# Patient Record
Sex: MALE | Race: WHITE | NOT HISPANIC OR LATINO | Employment: OTHER | URBAN - METROPOLITAN AREA
[De-identification: names, ages, dates, MRNs, and addresses within clinical notes are randomized per-mention and may not be internally consistent; named-entity substitution may affect disease eponyms.]

---

## 2020-02-19 ENCOUNTER — TELEPHONE (OUTPATIENT)
Dept: FAMILY MEDICINE | Facility: CLINIC | Age: 60
End: 2020-02-19

## 2020-02-19 ENCOUNTER — OFFICE VISIT (OUTPATIENT)
Dept: FAMILY MEDICINE | Facility: CLINIC | Age: 60
End: 2020-02-19
Payer: OTHER GOVERNMENT

## 2020-02-19 ENCOUNTER — HOSPITAL ENCOUNTER (OUTPATIENT)
Dept: RADIOLOGY | Facility: HOSPITAL | Age: 60
Discharge: HOME | End: 2020-02-19
Attending: NURSE PRACTITIONER
Payer: OTHER GOVERNMENT

## 2020-02-19 VITALS
HEIGHT: 67 IN | OXYGEN SATURATION: 96 % | BODY MASS INDEX: 15.85 KG/M2 | HEART RATE: 62 BPM | SYSTOLIC BLOOD PRESSURE: 130 MMHG | DIASTOLIC BLOOD PRESSURE: 82 MMHG | WEIGHT: 101 LBS

## 2020-02-19 DIAGNOSIS — F17.210 CIGARETTE SMOKER: ICD-10-CM

## 2020-02-19 DIAGNOSIS — F25.9 SCHIZOAFFECTIVE DISORDER, UNSPECIFIED TYPE: ICD-10-CM

## 2020-02-19 DIAGNOSIS — J20.9 ACUTE BRONCHITIS, UNSPECIFIED ORGANISM: ICD-10-CM

## 2020-02-19 DIAGNOSIS — I10 ESSENTIAL HYPERTENSION: ICD-10-CM

## 2020-02-19 DIAGNOSIS — J20.9 ACUTE BRONCHITIS, UNSPECIFIED ORGANISM: Primary | ICD-10-CM

## 2020-02-19 DIAGNOSIS — I49.3 PVC (PREMATURE VENTRICULAR CONTRACTION): ICD-10-CM

## 2020-02-19 LAB — EKG 12-LEAD: NORMAL

## 2020-02-19 PROCEDURE — 99213 PR OFFICE/OUTPT VISIT, EST, LEVL III, 20-29 MIN: ICD-10-PCS | Mod: 25,S$GLB,, | Performed by: NURSE PRACTITIONER

## 2020-02-19 PROCEDURE — 93000 ELECTROCARDIOGRAM COMPLETE: CPT | Mod: S$GLB,,, | Performed by: NURSE PRACTITIONER

## 2020-02-19 PROCEDURE — 99406 BEHAV CHNG SMOKING 3-10 MIN: CPT | Mod: S$GLB,,, | Performed by: NURSE PRACTITIONER

## 2020-02-19 PROCEDURE — 71046 X-RAY EXAM CHEST 2 VIEWS: CPT | Mod: TC,PO

## 2020-02-19 PROCEDURE — 93000 POCT EKG 12-LEAD: ICD-10-PCS | Mod: S$GLB,,, | Performed by: NURSE PRACTITIONER

## 2020-02-19 PROCEDURE — 99213 OFFICE O/P EST LOW 20 MIN: CPT | Mod: 25,S$GLB,, | Performed by: NURSE PRACTITIONER

## 2020-02-19 PROCEDURE — 99406 PR TOBACCO USE CESSATION INTERMEDIATE 3-10 MINUTES: ICD-10-PCS | Mod: S$GLB,,, | Performed by: NURSE PRACTITIONER

## 2020-02-19 RX ORDER — PREDNISONE 20 MG/1
40 TABLET ORAL DAILY
Qty: 10 TABLET | Refills: 0 | Status: SHIPPED | OUTPATIENT
Start: 2020-02-19 | End: 2020-02-24

## 2020-02-19 RX ORDER — AMLODIPINE BESYLATE 10 MG/1
10 TABLET ORAL DAILY
COMMUNITY

## 2020-02-19 RX ORDER — LISINOPRIL 40 MG/1
20 TABLET ORAL DAILY
COMMUNITY

## 2020-02-19 RX ORDER — AMOXICILLIN AND CLAVULANATE POTASSIUM 875; 125 MG/1; MG/1
1 TABLET, FILM COATED ORAL 2 TIMES DAILY
Qty: 14 TABLET | Refills: 0 | Status: SHIPPED | OUTPATIENT
Start: 2020-02-19 | End: 2021-06-14

## 2020-02-19 NOTE — PATIENT INSTRUCTIONS
How to Quit Smoking  Smoking is one of the hardest habits to break. About half of all people who have ever smoked have been able to quit. Most people who still smoke want to quit. Here are some of the best ways to stop smoking.    Keep trying  Most smokers make many attempts at quitting before they are successful. Its important not to give up.  Go cold turkey  Most former smokers quit cold turkey (all at once). Trying to cut back gradually doesn't seem to work as well, perhaps because it continues the smoking habit. Also, it is possible to inhale more while smoking fewer cigarettes. This results in the same amount of nicotine in your body.  Get support  Support programs can be a big help, especially for heavy smokers. These groups offer lectures, ways to change behavior, and peer support. Here are some ways to find a support program:  · Free national quitline: 800-QUIT-NOW (485-674-8460).  · Hospital quit-smoking programs.  · American Lung Association: (702.557.3245).  · American Cancer Society (741-073-2094).  Support at home is important too. Nonsmokers can offer praise and encouragement. If the smoker in your life finds it hard to quit, encourage them to keep trying.  Over-the-counter medicines  Nicotine replacement therapy may make quitting easier. Certain aids, such as the nicotine patch, gum, and lozenges, are available without a prescription. It is best to use these under a doctors care, though. The skin patch provides a steady supply of nicotine. Nicotine gum and lozenges give temporary bursts of low levels of nicotine. Both methods reduce the craving for cigarettes. Warning: If you have nausea, vomiting, dizziness, weakness, or a fast heartbeat, stop using these products and see your doctor.  Prescription medicines  After reviewing your smoking patterns and past attempts to quit, your doctor may offer a prescription medicine such as bupropion, varenicline, a nicotine inhaler, or nasal spray. Each has  "advantages and side effects. Your doctor can review these with you.  Health benefits of quitting  The benefits of quitting start right away and keep improving the longer you go without smoking. These benefits occur at any age.  So whether you are 17 or 70, quitting is a good decision. Some of the benefits include:  · 20 minutes: Blood pressure and pulse return to normal.  · 8 hours: Oxygen levels return to normal.  · 2 days: Ability to smell and taste begin to improve as damaged nerves regrow.  · 2 to 3 weeks: Circulation and lung function improve.  · 1 to 9 months: Coughing, congestion, and shortness of breath decrease; tiredness decreases.  · 1 year: Risk of heart attack decreases by half.  · 5 years: Risk of lung cancer decreases by half; risk of stroke becomes the same as a nonsmokers.  For more on how to quit smoking, try these online resources:   · [employer]k  · "Clearing the Air" booklet from the National Cancer [place]: [employer].gov/sites/default/files/pdf/clearing-the-air-accessible.pdf  Date Last Reviewed: 3/1/2017  © 9412-5839 [employer]. [address] [place] [address]. All rights reserved. This information is not intended as a substitute for professional medical care. Always follow your healthcare professional's instructions.        Acute Bronchitis  Your healthcare provider has told you that you have acute bronchitis. Bronchitis is infection or inflammation of the bronchial tubes (airways in the lungs). Normally, air moves easily in and out of the airways. Bronchitis narrows the airways, making it harder for air to flow in and out of the lungs. This causes symptoms such as shortness of breath, coughing up yellow or green mucus, and wheezing. Bronchitis can be acute or chronic. Acute means the condition comes on quickly and goes away in a short time, usually within 3 to 10 days. Chronic means a condition lasts a long time and often comes back.    What causes acute " bronchitis?  Acute bronchitis almost always starts as a viral respiratory infection, such as a cold or the flu. Certain factors make it more likely for a cold or flu to turn into bronchitis. These include being very young, being elderly, having a heart or lung problem, or having a weak immune system. Cigarette smoking also makes bronchitis more likely.  When bronchitis develops, the airways become swollen. The airways may also become infected with bacteria. This is known as a secondary infection.  Diagnosing acute bronchitis  Your healthcare provider will examine you and ask about your symptoms and health history. You may also have a sputum culture to test the fluid in your lungs. Chest X-rays may be done to look for infection in the lungs.  Treating acute bronchitis  Bronchitis usually clears up as the cold or flu goes away. You can help feel better faster by doing the following:  · Take medicine as directed. You may be told to take ibuprofen or other over-the-counter medicines. These help relieve inflammation in your bronchial tubes. Your healthcare provider may prescribe an inhaler to help open up the bronchial tubes. Most of the time, acute bronchitis is caused by a viral infection. Antibiotics are usually not prescribed for viral infections.  · Drink plenty of fluids, such as water, juice, or warm soup. Fluids loosen mucus so that you can cough it up. This helps you breathe more easily. Fluids also prevent dehydration.  · Make sure you get plenty of rest.  · Do not smoke. Do not allow anyone else to smoke in your home.  Recovery and follow-up  Follow up with your doctor as you are told. You will likely feel better in a week or two. But a dry cough can linger beyond that time. Let your doctor know if you still have symptoms (other than a dry cough) after 2 weeks, or if youre prone to getting bronchial infections. Take steps to protect yourself from future infections. These steps include stopping smoking and  avoiding tobacco smoke, washing your hands often, and getting a yearly flu shot.  When to call your healthcare provider  Call the healthcare provider if you have any of the following:  · Fever of 100.4°F (38.0°C) or higher, or as advised  · Symptoms that get worse, or new symptoms  · Trouble breathing  · Symptoms that dont start to improve within a week, or within 3 days of taking antibiotics   Date Last Reviewed: 12/1/2016  © 6168-4221 [employer]. [address] [place] [address], [place] [address]. All rights reserved. This information is not intended as a substitute for professional medical care. Always follow your healthcare professional's instructions.

## 2020-02-19 NOTE — TELEPHONE ENCOUNTER
----- Message from Nay Marroquin NP sent at 2/19/2020  1:36 PM CST -----  Please let patient know chest x-ray is negative for pneumonia

## 2020-02-19 NOTE — PROGRESS NOTES
SUBJECTIVE:    Patient ID: Pa Gallegos is a 59 y.o. male.    Chief Complaint: Nasal Congestion (brought bottles tb) and Cough (white mucus tb)    Pt here for sick visit- patient here with his mother today.  Patient reports started with coughing 4-5 days ago, clear mucus, occasional wheeze.  Patient denies any fever or chills, mild rhinorrhea.  Patient has been seen here in over 2 years, [facility] was last seen 8 months ago as he refused to go back to his last appointment.  Patient tells me he smokes 3 packs of cigarettes a day though mother states he likely smokes a lot last though he dumont a lot of cigarettes throughout the day.  He has never used an inhaler or nebulizer and denies diagnosis of COPD.  Patient is not aware of when his last chest x-ray was.        No visits with results within 6 Month(s) from this visit.   Latest known visit with results is:   No results found for any previous visit.       Past Medical History:   Diagnosis Date    Colonic polyp     Dyslipidemia     GERD (gastroesophageal reflux disease)     Hypertension     Hyponatremia     Schizoaffective disorder      Past Surgical History:   Procedure Laterality Date    COLONOSCOPY N/A 1/6/2016    Procedure: COLONOSCOPY;  Surgeon: Coni Navas MD;  Location: [facility];  Service: Endoscopy;  Laterality: N/A;     Family History   Problem Relation Age of Onset    Lung cancer Father        Marital Status: Single  Alcohol History:  reports that he does not drink alcohol.  Tobacco History:  reports that he has been smoking cigarettes. He has been smoking about 2.00 packs per day. He does not have any smokeless tobacco history on file.  Drug History:  has no drug history on file.    Health Maintenance Topics with due status: Not Due       Topic Last Completion Date    Colonoscopy 01/06/2016       There is no immunization history on file for this patient.    Review of patient's allergies indicates:  No Known  "Allergies    Current Outpatient Medications:     amLODIPine (NORVASC) 10 MG tablet, Take 10 mg by mouth once daily., Disp: , Rfl:     guaifenesin (MUCINEX) 600 mg 12 hr tablet, Take 1,200 mg by mouth 2 (two) times daily., Disp: , Rfl:     lisinopril (PRINIVIL,ZESTRIL) 40 MG tablet, Take 40 mg by mouth once daily., Disp: , Rfl:     loratadine (CLARITIN) 10 mg tablet, Take 10 mg by mouth once daily., Disp: , Rfl:     metoprolol succinate (TOPROL-XL) 50 MG 24 hr tablet, Take 50 mg by mouth once daily., Disp: , Rfl:     multivit-min/ferrous fumarate (MULTI VITAMIN ORAL), Take by mouth., Disp: , Rfl:     risperidone (RISPERDAL) 4 MG tablet, Take 4 mg by mouth 2 (two) times daily., Disp: , Rfl:     amoxicillin-clavulanate 875-125mg (AUGMENTIN) 875-125 mg per tablet, Take 1 tablet by mouth 2 (two) times daily., Disp: 14 tablet, Rfl: 0    cyanocobalamin (VITAMIN B-12) 1000 MCG tablet, Take 100 mcg by mouth once daily., Disp: , Rfl:     predniSONE (DELTASONE) 20 MG tablet, Take 2 tablets (40 mg total) by mouth once daily. for 5 days, Disp: 10 tablet, Rfl: 0    Review of Systems   Constitutional: Positive for appetite change ( mother reports patient has always had a very poor appetite and they struggle to get him to eat 2 meals a day). Negative for chills, fever and unexpected weight change (Patient/mother deny any significant weight loss).   HENT: Positive for rhinorrhea. Negative for congestion, ear pain, postnasal drip, sinus pain and sore throat.    Respiratory: Positive for cough and wheezing. Negative for shortness of breath.    Cardiovascular: Negative for chest pain.   Gastrointestinal: Negative for abdominal pain, nausea and vomiting.   Genitourinary: Negative for dysuria.   Skin: Negative for rash.   Neurological: Negative for dizziness and headaches.          Objective:      Vitals:    02/19/20 1048   BP: 130/82   Pulse: 62   SpO2: 96%   Weight: 45.8 kg (101 lb)   Height: 5' 7" (1.702 m)     Physical Exam "   Constitutional: He appears well-developed.   Cachectic white male   HENT:   Head: Normocephalic and atraumatic.   Right Ear: Tympanic membrane and ear canal normal.   Left Ear: Tympanic membrane and ear canal normal.   Nose: No mucosal edema or rhinorrhea. Right sinus exhibits no maxillary sinus tenderness and no frontal sinus tenderness. Left sinus exhibits no maxillary sinus tenderness and no frontal sinus tenderness.   Mouth/Throat: Mucous membranes are normal. No oropharyngeal exudate or posterior oropharyngeal erythema. No tonsillar exudate.   Eyes: Conjunctivae are normal.   Neck: Neck supple.   Cardiovascular: Normal rate. An irregular rhythm present. Exam reveals no friction rub.   No murmur heard.  Pulmonary/Chest: Effort normal. He has wheezes. He has no rales.   Significantly diminished throughout with expiratory wheeze right base posteriorly   Musculoskeletal: He exhibits no edema.   Neurological: He is alert.   Skin: No rash noted.   Psychiatric: His mood appears anxious.   Nursing note and vitals reviewed.      Assistance with smoking cessation was offered, including:  []  Medications  [x]  Counseling  [x]  Printed Information on Smoking Cessation  [x]  Referral to a Smoking Cessation Program    Patient was counseled regarding smoking for 3-10 minutes.    Assessment:       1. Acute bronchitis, unspecified organism    2. PVC (premature ventricular contraction)    3. Essential hypertension    4. Schizoaffective disorder, unspecified type    5. Cigarette smoker           Plan:       Acute bronchitis, unspecified organism  -     predniSONE (DELTASONE) 20 MG tablet; Take 2 tablets (40 mg total) by mouth once daily. for 5 days  Dispense: 10 tablet; Refill: 0  -     amoxicillin-clavulanate 875-125mg (AUGMENTIN) 875-125 mg per tablet; Take 1 tablet by mouth 2 (two) times daily.  Dispense: 14 tablet; Refill: 0  -     X-Ray Chest PA And Lateral; Future; Expected date: 02/19/2020  -patient/mother advised add  "steroids and antibiotics for acute bronchitis though given tobacco use and exam suspect COPD.  When I try to talk to patient about other medications he repeatedly tells me "I am only here for my bronchitis" and does not want to consider inhaler use.  He did finally agree to at least a x-ray as it is unclear when the last time he had one    PVC (premature ventricular contraction)  -     POCT EKG 12-LEAD (NOT FOR [facility] USE)  -frequent PVCs on EKG, patient asymptomatic, continue metoprolol    Essential hypertension  -BP well controlled.  Stressed importance with patient of regular lab work and encouraged [facility] by [facility]    Schizoaffective disorder, unspecified type  -mother reports stable, managed with risperidone only    Cigarette smoker  -smoking cessation counseling provided though patient is not interested and trying to quit.  Also discussed with him lung cancer screening CT scan which I recommend which he refuses, is agreeable for chest x-ray      Follow up in about 3 months (around 5/19/2020).        2/19/2020 Nay Marroquin NP    " Yes

## 2020-05-12 ENCOUNTER — TELEPHONE (OUTPATIENT)
Dept: FAMILY MEDICINE | Facility: CLINIC | Age: 60
End: 2020-05-12

## 2021-06-14 ENCOUNTER — HOSPITAL ENCOUNTER (INPATIENT)
Facility: HOSPITAL | Age: 61
LOS: 2 days | Discharge: HOME | DRG: 641 | End: 2021-06-16
Attending: EMERGENCY MEDICINE | Admitting: INTERNAL MEDICINE
Payer: OTHER GOVERNMENT

## 2021-06-14 DIAGNOSIS — R56.9 SEIZURE: ICD-10-CM

## 2021-06-14 DIAGNOSIS — E87.1 HYPONATREMIA: Primary | ICD-10-CM

## 2021-06-14 DIAGNOSIS — R41.82 ALTERED MENTAL STATUS: ICD-10-CM

## 2021-06-14 DIAGNOSIS — R09.02 HYPOXIA: ICD-10-CM

## 2021-06-14 LAB
ALBUMIN SERPL BCP-MCNC: 4.1 G/DL (ref 3.5–5.2)
ALP SERPL-CCNC: 83 U/L (ref 55–135)
ALT SERPL W/O P-5'-P-CCNC: 21 U/L (ref 10–44)
AMPHET+METHAMPHET UR QL: NEGATIVE
ANION GAP SERPL CALC-SCNC: 11 MMOL/L (ref 8–16)
ANION GAP SERPL CALC-SCNC: 15 MMOL/L (ref 8–16)
AST SERPL-CCNC: 41 U/L (ref 10–40)
BARBITURATES UR QL SCN>200 NG/ML: NEGATIVE
BASOPHILS # BLD AUTO: 0.01 K/UL (ref 0–0.2)
BASOPHILS NFR BLD: 0.1 % (ref 0–1.9)
BENZODIAZ UR QL SCN>200 NG/ML: NEGATIVE
BILIRUB SERPL-MCNC: 1.9 MG/DL (ref 0.1–1)
BILIRUB UR QL STRIP: NEGATIVE
BUN SERPL-MCNC: 13 MG/DL (ref 8–23)
BUN SERPL-MCNC: 13 MG/DL (ref 8–23)
BZE UR QL SCN: NEGATIVE
CALCIUM SERPL-MCNC: 8.5 MG/DL (ref 8.7–10.5)
CALCIUM SERPL-MCNC: 8.6 MG/DL (ref 8.7–10.5)
CANNABINOIDS UR QL SCN: NEGATIVE
CHLORIDE SERPL-SCNC: 77 MMOL/L (ref 95–110)
CHLORIDE SERPL-SCNC: 84 MMOL/L (ref 95–110)
CLARITY UR: CLEAR
CO2 SERPL-SCNC: 24 MMOL/L (ref 23–29)
CO2 SERPL-SCNC: 25 MMOL/L (ref 23–29)
COLOR UR: YELLOW
CREAT SERPL-MCNC: 0.6 MG/DL (ref 0.5–1.4)
CREAT SERPL-MCNC: 0.9 MG/DL (ref 0.5–1.4)
CREAT UR-MCNC: 31 MG/DL (ref 23–375)
DIFFERENTIAL METHOD: ABNORMAL
EOSINOPHIL # BLD AUTO: 0 K/UL (ref 0–0.5)
EOSINOPHIL NFR BLD: 0 % (ref 0–8)
ERYTHROCYTE [DISTWIDTH] IN BLOOD BY AUTOMATED COUNT: 12.1 % (ref 11.5–14.5)
EST. GFR  (AFRICAN AMERICAN): >60 ML/MIN/1.73 M^2
EST. GFR  (AFRICAN AMERICAN): >60 ML/MIN/1.73 M^2
EST. GFR  (NON AFRICAN AMERICAN): >60 ML/MIN/1.73 M^2
EST. GFR  (NON AFRICAN AMERICAN): >60 ML/MIN/1.73 M^2
ETHANOL SERPL-MCNC: <5 MG/DL
GLUCOSE SERPL-MCNC: 106 MG/DL (ref 70–110)
GLUCOSE SERPL-MCNC: 147 MG/DL (ref 70–110)
GLUCOSE UR QL STRIP: NEGATIVE
HCT VFR BLD AUTO: 36.1 % (ref 40–54)
HGB BLD-MCNC: 12.9 G/DL (ref 14–18)
HGB UR QL STRIP: NEGATIVE
IMM GRANULOCYTES # BLD AUTO: 0.1 K/UL (ref 0–0.04)
IMM GRANULOCYTES NFR BLD AUTO: 0.7 % (ref 0–0.5)
KETONES UR QL STRIP: ABNORMAL
LEUKOCYTE ESTERASE UR QL STRIP: NEGATIVE
LYMPHOCYTES # BLD AUTO: 1 K/UL (ref 1–4.8)
LYMPHOCYTES NFR BLD: 6.8 % (ref 18–48)
MAGNESIUM SERPL-MCNC: 1.7 MG/DL (ref 1.6–2.6)
MCH RBC QN AUTO: 30.4 PG (ref 27–31)
MCHC RBC AUTO-ENTMCNC: 35.7 G/DL (ref 32–36)
MCV RBC AUTO: 85 FL (ref 82–98)
MONOCYTES # BLD AUTO: 1.1 K/UL (ref 0.3–1)
MONOCYTES NFR BLD: 7.6 % (ref 4–15)
NEUTROPHILS # BLD AUTO: 12.4 K/UL (ref 1.8–7.7)
NEUTROPHILS NFR BLD: 84.8 % (ref 38–73)
NITRITE UR QL STRIP: NEGATIVE
NRBC BLD-RTO: 0 /100 WBC
OPIATES UR QL SCN: NEGATIVE
OSMOLALITY UR: 267 MOSM/KG (ref 50–1200)
PCP UR QL SCN>25 NG/ML: NEGATIVE
PH UR STRIP: 7 [PH] (ref 5–8)
PLATELET # BLD AUTO: 298 K/UL (ref 150–450)
PMV BLD AUTO: 9.4 FL (ref 9.2–12.9)
POTASSIUM SERPL-SCNC: 3.2 MMOL/L (ref 3.5–5.1)
POTASSIUM SERPL-SCNC: 3.4 MMOL/L (ref 3.5–5.1)
PROT SERPL-MCNC: 7.2 G/DL (ref 6–8.4)
PROT UR QL STRIP: ABNORMAL
RBC # BLD AUTO: 4.24 M/UL (ref 4.6–6.2)
SARS-COV-2 RDRP RESP QL NAA+PROBE: NEGATIVE
SODIUM SERPL-SCNC: 116 MMOL/L (ref 136–145)
SODIUM SERPL-SCNC: 120 MMOL/L (ref 136–145)
SODIUM UR-SCNC: 38 MMOL/L (ref 20–250)
SP GR UR STRIP: 1.01 (ref 1–1.03)
TOXICOLOGY INFORMATION: NORMAL
TROPONIN I SERPL DL<=0.01 NG/ML-MCNC: <0.03 NG/ML
URN SPEC COLLECT METH UR: ABNORMAL
UROBILINOGEN UR STRIP-ACNC: NEGATIVE EU/DL
WBC # BLD AUTO: 14.64 K/UL (ref 3.9–12.7)

## 2021-06-14 PROCEDURE — 25000003 PHARM REV CODE 250: Performed by: NURSE PRACTITIONER

## 2021-06-14 PROCEDURE — 81003 URINALYSIS AUTO W/O SCOPE: CPT | Mod: 59 | Performed by: EMERGENCY MEDICINE

## 2021-06-14 PROCEDURE — 84300 ASSAY OF URINE SODIUM: CPT | Performed by: NURSE PRACTITIONER

## 2021-06-14 PROCEDURE — 63600175 PHARM REV CODE 636 W HCPCS: Performed by: NURSE PRACTITIONER

## 2021-06-14 PROCEDURE — 83735 ASSAY OF MAGNESIUM: CPT | Performed by: EMERGENCY MEDICINE

## 2021-06-14 PROCEDURE — 84484 ASSAY OF TROPONIN QUANT: CPT | Performed by: NURSE PRACTITIONER

## 2021-06-14 PROCEDURE — 63600175 PHARM REV CODE 636 W HCPCS: Performed by: EMERGENCY MEDICINE

## 2021-06-14 PROCEDURE — 20000000 HC ICU ROOM

## 2021-06-14 PROCEDURE — 36415 COLL VENOUS BLD VENIPUNCTURE: CPT | Performed by: EMERGENCY MEDICINE

## 2021-06-14 PROCEDURE — 80048 BASIC METABOLIC PNL TOTAL CA: CPT | Performed by: NURSE PRACTITIONER

## 2021-06-14 PROCEDURE — 93010 ELECTROCARDIOGRAM REPORT: CPT | Mod: ,,, | Performed by: SPECIALIST

## 2021-06-14 PROCEDURE — 82077 ASSAY SPEC XCP UR&BREATH IA: CPT | Performed by: EMERGENCY MEDICINE

## 2021-06-14 PROCEDURE — 99285 EMERGENCY DEPT VISIT HI MDM: CPT | Mod: 25

## 2021-06-14 PROCEDURE — 83935 ASSAY OF URINE OSMOLALITY: CPT | Mod: 91 | Performed by: PSYCHIATRY & NEUROLOGY

## 2021-06-14 PROCEDURE — 80307 DRUG TEST PRSMV CHEM ANLYZR: CPT | Performed by: EMERGENCY MEDICINE

## 2021-06-14 PROCEDURE — 25000003 PHARM REV CODE 250: Performed by: EMERGENCY MEDICINE

## 2021-06-14 PROCEDURE — 93005 ELECTROCARDIOGRAM TRACING: CPT | Performed by: SPECIALIST

## 2021-06-14 PROCEDURE — 96365 THER/PROPH/DIAG IV INF INIT: CPT

## 2021-06-14 PROCEDURE — 85025 COMPLETE CBC W/AUTO DIFF WBC: CPT | Performed by: EMERGENCY MEDICINE

## 2021-06-14 PROCEDURE — U0002 COVID-19 LAB TEST NON-CDC: HCPCS | Performed by: EMERGENCY MEDICINE

## 2021-06-14 PROCEDURE — 84484 ASSAY OF TROPONIN QUANT: CPT | Mod: 91 | Performed by: EMERGENCY MEDICINE

## 2021-06-14 PROCEDURE — 93010 EKG 12-LEAD: ICD-10-PCS | Mod: ,,, | Performed by: SPECIALIST

## 2021-06-14 PROCEDURE — 80053 COMPREHEN METABOLIC PANEL: CPT | Performed by: EMERGENCY MEDICINE

## 2021-06-14 PROCEDURE — 63600175 PHARM REV CODE 636 W HCPCS: Performed by: INTERNAL MEDICINE

## 2021-06-14 PROCEDURE — 83935 ASSAY OF URINE OSMOLALITY: CPT | Performed by: NURSE PRACTITIONER

## 2021-06-14 RX ORDER — LANOLIN ALCOHOL/MO/W.PET/CERES
800 CREAM (GRAM) TOPICAL
Status: DISCONTINUED | OUTPATIENT
Start: 2021-06-14 | End: 2021-06-16 | Stop reason: HOSPADM

## 2021-06-14 RX ORDER — POTASSIUM CHLORIDE 7.45 MG/ML
20 INJECTION INTRAVENOUS
Status: DISCONTINUED | OUTPATIENT
Start: 2021-06-14 | End: 2021-06-16 | Stop reason: HOSPADM

## 2021-06-14 RX ORDER — POTASSIUM CHLORIDE 20 MEQ/1
20 TABLET, EXTENDED RELEASE ORAL
Status: DISCONTINUED | OUTPATIENT
Start: 2021-06-14 | End: 2021-06-16 | Stop reason: HOSPADM

## 2021-06-14 RX ORDER — MAGNESIUM SULFATE HEPTAHYDRATE 40 MG/ML
2 INJECTION, SOLUTION INTRAVENOUS
Status: DISCONTINUED | OUTPATIENT
Start: 2021-06-14 | End: 2021-06-16 | Stop reason: HOSPADM

## 2021-06-14 RX ORDER — SODIUM CHLORIDE 0.9 % (FLUSH) 0.9 %
10 SYRINGE (ML) INJECTION
Status: DISCONTINUED | OUTPATIENT
Start: 2021-06-14 | End: 2021-06-16 | Stop reason: HOSPADM

## 2021-06-14 RX ORDER — SODIUM CHLORIDE 9 MG/ML
INJECTION, SOLUTION INTRAVENOUS CONTINUOUS
Status: DISCONTINUED | OUTPATIENT
Start: 2021-06-14 | End: 2021-06-16 | Stop reason: HOSPADM

## 2021-06-14 RX ORDER — IPRATROPIUM BROMIDE AND ALBUTEROL SULFATE 2.5; .5 MG/3ML; MG/3ML
3 SOLUTION RESPIRATORY (INHALATION) EVERY 8 HOURS
Status: DISCONTINUED | OUTPATIENT
Start: 2021-06-15 | End: 2021-06-15

## 2021-06-14 RX ORDER — MAGNESIUM SULFATE 1 G/100ML
1 INJECTION INTRAVENOUS
Status: DISCONTINUED | OUTPATIENT
Start: 2021-06-14 | End: 2021-06-16 | Stop reason: HOSPADM

## 2021-06-14 RX ORDER — 3% SODIUM CHLORIDE 3 G/100ML
100 INJECTION, SOLUTION INTRAVENOUS CONTINUOUS
Status: DISCONTINUED | OUTPATIENT
Start: 2021-06-14 | End: 2021-06-14

## 2021-06-14 RX ORDER — ACETAMINOPHEN 325 MG/1
650 TABLET ORAL EVERY 4 HOURS PRN
Status: DISCONTINUED | OUTPATIENT
Start: 2021-06-14 | End: 2021-06-16 | Stop reason: HOSPADM

## 2021-06-14 RX ORDER — RISPERIDONE 1 MG/1
4 TABLET ORAL 2 TIMES DAILY
Status: DISCONTINUED | OUTPATIENT
Start: 2021-06-14 | End: 2021-06-16 | Stop reason: HOSPADM

## 2021-06-14 RX ORDER — CETIRIZINE HYDROCHLORIDE 10 MG/1
10 TABLET ORAL NIGHTLY
Status: DISCONTINUED | OUTPATIENT
Start: 2021-06-14 | End: 2021-06-16 | Stop reason: HOSPADM

## 2021-06-14 RX ORDER — POTASSIUM CHLORIDE 7.45 MG/ML
40 INJECTION INTRAVENOUS
Status: DISCONTINUED | OUTPATIENT
Start: 2021-06-14 | End: 2021-06-16 | Stop reason: HOSPADM

## 2021-06-14 RX ORDER — ONDANSETRON 2 MG/ML
4 INJECTION INTRAMUSCULAR; INTRAVENOUS EVERY 6 HOURS PRN
Status: DISCONTINUED | OUTPATIENT
Start: 2021-06-14 | End: 2021-06-16 | Stop reason: HOSPADM

## 2021-06-14 RX ORDER — HYDRALAZINE HYDROCHLORIDE 20 MG/ML
10 INJECTION INTRAMUSCULAR; INTRAVENOUS EVERY 6 HOURS PRN
Status: DISCONTINUED | OUTPATIENT
Start: 2021-06-14 | End: 2021-06-16 | Stop reason: HOSPADM

## 2021-06-14 RX ORDER — LEVETIRACETAM 5 MG/ML
500 INJECTION INTRAVASCULAR ONCE
Status: COMPLETED | OUTPATIENT
Start: 2021-06-14 | End: 2021-06-14

## 2021-06-14 RX ORDER — POTASSIUM CHLORIDE 20 MEQ/1
40 TABLET, EXTENDED RELEASE ORAL
Status: DISCONTINUED | OUTPATIENT
Start: 2021-06-14 | End: 2021-06-16 | Stop reason: HOSPADM

## 2021-06-14 RX ORDER — GUAIFENESIN 600 MG/1
1200 TABLET, EXTENDED RELEASE ORAL 2 TIMES DAILY
Status: DISCONTINUED | OUTPATIENT
Start: 2021-06-14 | End: 2021-06-16 | Stop reason: HOSPADM

## 2021-06-14 RX ORDER — LISINOPRIL 20 MG/1
20 TABLET ORAL NIGHTLY
Status: DISCONTINUED | OUTPATIENT
Start: 2021-06-14 | End: 2021-06-16 | Stop reason: HOSPADM

## 2021-06-14 RX ORDER — MAGNESIUM SULFATE HEPTAHYDRATE 40 MG/ML
4 INJECTION, SOLUTION INTRAVENOUS
Status: DISCONTINUED | OUTPATIENT
Start: 2021-06-14 | End: 2021-06-16 | Stop reason: HOSPADM

## 2021-06-14 RX ORDER — AMLODIPINE BESYLATE 5 MG/1
10 TABLET ORAL NIGHTLY
Status: DISCONTINUED | OUTPATIENT
Start: 2021-06-14 | End: 2021-06-16 | Stop reason: HOSPADM

## 2021-06-14 RX ORDER — METOPROLOL SUCCINATE 50 MG/1
50 TABLET, EXTENDED RELEASE ORAL NIGHTLY
Status: DISCONTINUED | OUTPATIENT
Start: 2021-06-14 | End: 2021-06-16 | Stop reason: HOSPADM

## 2021-06-14 RX ADMIN — HYDRALAZINE HYDROCHLORIDE 10 MG: 20 INJECTION, SOLUTION INTRAMUSCULAR; INTRAVENOUS at 07:06

## 2021-06-14 RX ADMIN — CETIRIZINE HYDROCHLORIDE 10 MG: 10 TABLET, FILM COATED ORAL at 10:06

## 2021-06-14 RX ADMIN — POTASSIUM CHLORIDE 40 MEQ: 20 TABLET, EXTENDED RELEASE ORAL at 10:06

## 2021-06-14 RX ADMIN — SODIUM CHLORIDE 50 ML/HR: 0.9 INJECTION, SOLUTION INTRAVENOUS at 09:06

## 2021-06-14 RX ADMIN — GUAIFENESIN 1200 MG: 600 TABLET, EXTENDED RELEASE ORAL at 10:06

## 2021-06-14 RX ADMIN — SODIUM CHLORIDE: 234 INJECTION, SOLUTION, CONCENTRATE INTRAVENOUS at 05:06

## 2021-06-14 RX ADMIN — POTASSIUM CHLORIDE 40 MEQ: 1500 TABLET, EXTENDED RELEASE ORAL at 10:06

## 2021-06-14 RX ADMIN — RISPERIDONE 4 MG: 1 TABLET ORAL at 10:06

## 2021-06-14 RX ADMIN — MAGNESIUM OXIDE 800 MG: 400 TABLET ORAL at 10:06

## 2021-06-14 RX ADMIN — LISINOPRIL 20 MG: 20 TABLET ORAL at 10:06

## 2021-06-14 RX ADMIN — LEVETIRACETAM 500 MG: 5 INJECTION, SOLUTION INTRAVENOUS at 09:06

## 2021-06-14 RX ADMIN — AMLODIPINE BESYLATE 10 MG: 5 TABLET ORAL at 10:06

## 2021-06-14 RX ADMIN — METOPROLOL SUCCINATE 50 MG: 50 TABLET, FILM COATED, EXTENDED RELEASE ORAL at 10:06

## 2021-06-15 LAB
25(OH)D3+25(OH)D2 SERPL-MCNC: 15 NG/ML (ref 30–96)
ALBUMIN SERPL BCP-MCNC: 3.5 G/DL (ref 3.5–5.2)
ALP SERPL-CCNC: 75 U/L (ref 55–135)
ALT SERPL W/O P-5'-P-CCNC: 17 U/L (ref 10–44)
AMMONIA PLAS-SCNC: 20 UMOL/L (ref 10–50)
ANION GAP SERPL CALC-SCNC: 10 MMOL/L (ref 8–16)
ANION GAP SERPL CALC-SCNC: 11 MMOL/L (ref 8–16)
ANION GAP SERPL CALC-SCNC: 12 MMOL/L (ref 8–16)
ANION GAP SERPL CALC-SCNC: 9 MMOL/L (ref 8–16)
AST SERPL-CCNC: 29 U/L (ref 10–40)
BASOPHILS # BLD AUTO: 0.01 K/UL (ref 0–0.2)
BASOPHILS NFR BLD: 0.1 % (ref 0–1.9)
BILIRUB SERPL-MCNC: 1.2 MG/DL (ref 0.1–1)
BUN SERPL-MCNC: 10 MG/DL (ref 8–23)
BUN SERPL-MCNC: 13 MG/DL (ref 8–23)
CALCIUM SERPL-MCNC: 7.9 MG/DL (ref 8.7–10.5)
CALCIUM SERPL-MCNC: 7.9 MG/DL (ref 8.7–10.5)
CALCIUM SERPL-MCNC: 8.3 MG/DL (ref 8.7–10.5)
CALCIUM SERPL-MCNC: 8.5 MG/DL (ref 8.7–10.5)
CALCIUM SERPL-MCNC: 8.7 MG/DL (ref 8.7–10.5)
CALCIUM SERPL-MCNC: 8.8 MG/DL (ref 8.7–10.5)
CHLORIDE SERPL-SCNC: 86 MMOL/L (ref 95–110)
CHLORIDE SERPL-SCNC: 90 MMOL/L (ref 95–110)
CHLORIDE SERPL-SCNC: 91 MMOL/L (ref 95–110)
CHLORIDE SERPL-SCNC: 93 MMOL/L (ref 95–110)
CHLORIDE SERPL-SCNC: 95 MMOL/L (ref 95–110)
CHLORIDE SERPL-SCNC: 95 MMOL/L (ref 95–110)
CO2 SERPL-SCNC: 23 MMOL/L (ref 23–29)
CO2 SERPL-SCNC: 23 MMOL/L (ref 23–29)
CO2 SERPL-SCNC: 25 MMOL/L (ref 23–29)
CO2 SERPL-SCNC: 26 MMOL/L (ref 23–29)
CO2 SERPL-SCNC: 26 MMOL/L (ref 23–29)
CO2 SERPL-SCNC: 28 MMOL/L (ref 23–29)
CREAT SERPL-MCNC: 0.5 MG/DL (ref 0.5–1.4)
CREAT SERPL-MCNC: 0.5 MG/DL (ref 0.5–1.4)
CREAT SERPL-MCNC: 0.6 MG/DL (ref 0.5–1.4)
CREAT SERPL-MCNC: 0.6 MG/DL (ref 0.5–1.4)
CREAT SERPL-MCNC: 0.7 MG/DL (ref 0.5–1.4)
CREAT SERPL-MCNC: 0.7 MG/DL (ref 0.5–1.4)
DIFFERENTIAL METHOD: ABNORMAL
EOSINOPHIL # BLD AUTO: 0 K/UL (ref 0–0.5)
EOSINOPHIL NFR BLD: 0.1 % (ref 0–8)
ERYTHROCYTE [DISTWIDTH] IN BLOOD BY AUTOMATED COUNT: 12.7 % (ref 11.5–14.5)
EST. GFR  (AFRICAN AMERICAN): >60 ML/MIN/1.73 M^2
EST. GFR  (NON AFRICAN AMERICAN): >60 ML/MIN/1.73 M^2
GLUCOSE SERPL-MCNC: 107 MG/DL (ref 70–110)
GLUCOSE SERPL-MCNC: 115 MG/DL (ref 70–110)
GLUCOSE SERPL-MCNC: 122 MG/DL (ref 70–110)
GLUCOSE SERPL-MCNC: 83 MG/DL (ref 70–110)
GLUCOSE SERPL-MCNC: 83 MG/DL (ref 70–110)
GLUCOSE SERPL-MCNC: 97 MG/DL (ref 70–110)
HCT VFR BLD AUTO: 36.1 % (ref 40–54)
HGB BLD-MCNC: 12.7 G/DL (ref 14–18)
IMM GRANULOCYTES # BLD AUTO: 0.02 K/UL (ref 0–0.04)
IMM GRANULOCYTES NFR BLD AUTO: 0.2 % (ref 0–0.5)
LYMPHOCYTES # BLD AUTO: 1.5 K/UL (ref 1–4.8)
LYMPHOCYTES NFR BLD: 17.5 % (ref 18–48)
MAGNESIUM SERPL-MCNC: 1.9 MG/DL (ref 1.6–2.6)
MCH RBC QN AUTO: 30.1 PG (ref 27–31)
MCHC RBC AUTO-ENTMCNC: 35.2 G/DL (ref 32–36)
MCV RBC AUTO: 86 FL (ref 82–98)
MONOCYTES # BLD AUTO: 1.2 K/UL (ref 0.3–1)
MONOCYTES NFR BLD: 13.2 % (ref 4–15)
NEUTROPHILS # BLD AUTO: 6.1 K/UL (ref 1.8–7.7)
NEUTROPHILS NFR BLD: 68.9 % (ref 38–73)
NRBC BLD-RTO: 0 /100 WBC
OSMOLALITY UR: 133 MOSM/KG (ref 50–1200)
PLATELET # BLD AUTO: 246 K/UL (ref 150–450)
PMV BLD AUTO: 10.2 FL (ref 9.2–12.9)
POTASSIUM SERPL-SCNC: 3.3 MMOL/L (ref 3.5–5.1)
POTASSIUM SERPL-SCNC: 3.5 MMOL/L (ref 3.5–5.1)
POTASSIUM SERPL-SCNC: 3.8 MMOL/L (ref 3.5–5.1)
POTASSIUM SERPL-SCNC: 3.8 MMOL/L (ref 3.5–5.1)
POTASSIUM SERPL-SCNC: 3.9 MMOL/L (ref 3.5–5.1)
POTASSIUM SERPL-SCNC: 4.1 MMOL/L (ref 3.5–5.1)
PROT SERPL-MCNC: 6 G/DL (ref 6–8.4)
RBC # BLD AUTO: 4.22 M/UL (ref 4.6–6.2)
SODIUM SERPL-SCNC: 123 MMOL/L (ref 136–145)
SODIUM SERPL-SCNC: 126 MMOL/L (ref 136–145)
SODIUM SERPL-SCNC: 128 MMOL/L (ref 136–145)
SODIUM SERPL-SCNC: 130 MMOL/L (ref 136–145)
TROPONIN I SERPL DL<=0.01 NG/ML-MCNC: <0.03 NG/ML
TROPONIN I SERPL DL<=0.01 NG/ML-MCNC: <0.03 NG/ML
VIT B12 SERPL-MCNC: 400 PG/ML (ref 210–950)
WBC # BLD AUTO: 8.81 K/UL (ref 3.9–12.7)

## 2021-06-15 PROCEDURE — 99900035 HC TECH TIME PER 15 MIN (STAT)

## 2021-06-15 PROCEDURE — 80053 COMPREHEN METABOLIC PANEL: CPT | Performed by: NURSE PRACTITIONER

## 2021-06-15 PROCEDURE — 25000242 PHARM REV CODE 250 ALT 637 W/ HCPCS: Performed by: INTERNAL MEDICINE

## 2021-06-15 PROCEDURE — 95819 EEG AWAKE AND ASLEEP: CPT

## 2021-06-15 PROCEDURE — 20000000 HC ICU ROOM

## 2021-06-15 PROCEDURE — 94761 N-INVAS EAR/PLS OXIMETRY MLT: CPT

## 2021-06-15 PROCEDURE — 80048 BASIC METABOLIC PNL TOTAL CA: CPT | Performed by: NURSE PRACTITIONER

## 2021-06-15 PROCEDURE — 84425 ASSAY OF VITAMIN B-1: CPT | Performed by: STUDENT IN AN ORGANIZED HEALTH CARE EDUCATION/TRAINING PROGRAM

## 2021-06-15 PROCEDURE — 25000003 PHARM REV CODE 250: Performed by: NURSE PRACTITIONER

## 2021-06-15 PROCEDURE — 84484 ASSAY OF TROPONIN QUANT: CPT | Performed by: NURSE PRACTITIONER

## 2021-06-15 PROCEDURE — 82607 VITAMIN B-12: CPT | Performed by: STUDENT IN AN ORGANIZED HEALTH CARE EDUCATION/TRAINING PROGRAM

## 2021-06-15 PROCEDURE — 36415 COLL VENOUS BLD VENIPUNCTURE: CPT | Performed by: NURSE PRACTITIONER

## 2021-06-15 PROCEDURE — 85025 COMPLETE CBC W/AUTO DIFF WBC: CPT | Performed by: NURSE PRACTITIONER

## 2021-06-15 PROCEDURE — 83735 ASSAY OF MAGNESIUM: CPT | Performed by: NURSE PRACTITIONER

## 2021-06-15 PROCEDURE — 80048 BASIC METABOLIC PNL TOTAL CA: CPT | Mod: 91 | Performed by: INTERNAL MEDICINE

## 2021-06-15 PROCEDURE — 25000003 PHARM REV CODE 250: Performed by: INTERNAL MEDICINE

## 2021-06-15 PROCEDURE — 94640 AIRWAY INHALATION TREATMENT: CPT

## 2021-06-15 PROCEDURE — 84207 ASSAY OF VITAMIN B-6: CPT | Performed by: STUDENT IN AN ORGANIZED HEALTH CARE EDUCATION/TRAINING PROGRAM

## 2021-06-15 PROCEDURE — 27000221 HC OXYGEN, UP TO 24 HOURS

## 2021-06-15 PROCEDURE — 25000003 PHARM REV CODE 250

## 2021-06-15 PROCEDURE — 99900031 HC PATIENT EDUCATION (STAT)

## 2021-06-15 PROCEDURE — 82306 VITAMIN D 25 HYDROXY: CPT | Performed by: STUDENT IN AN ORGANIZED HEALTH CARE EDUCATION/TRAINING PROGRAM

## 2021-06-15 PROCEDURE — 25000003 PHARM REV CODE 250: Performed by: STUDENT IN AN ORGANIZED HEALTH CARE EDUCATION/TRAINING PROGRAM

## 2021-06-15 PROCEDURE — 82140 ASSAY OF AMMONIA: CPT | Performed by: STUDENT IN AN ORGANIZED HEALTH CARE EDUCATION/TRAINING PROGRAM

## 2021-06-15 RX ORDER — CHLORHEXIDINE GLUCONATE ORAL RINSE 1.2 MG/ML
15 SOLUTION DENTAL 2 TIMES DAILY
Status: DISCONTINUED | OUTPATIENT
Start: 2021-06-15 | End: 2021-06-16 | Stop reason: HOSPADM

## 2021-06-15 RX ORDER — IPRATROPIUM BROMIDE AND ALBUTEROL SULFATE 2.5; .5 MG/3ML; MG/3ML
SOLUTION RESPIRATORY (INHALATION)
Status: DISPENSED
Start: 2021-06-15 | End: 2021-06-15

## 2021-06-15 RX ORDER — MUPIROCIN 20 MG/G
OINTMENT TOPICAL 2 TIMES DAILY
Status: DISCONTINUED | OUTPATIENT
Start: 2021-06-15 | End: 2021-06-16 | Stop reason: HOSPADM

## 2021-06-15 RX ORDER — IPRATROPIUM BROMIDE AND ALBUTEROL SULFATE 2.5; .5 MG/3ML; MG/3ML
3 SOLUTION RESPIRATORY (INHALATION) 3 TIMES DAILY
Status: DISCONTINUED | OUTPATIENT
Start: 2021-06-15 | End: 2021-06-15

## 2021-06-15 RX ADMIN — AMLODIPINE BESYLATE 10 MG: 5 TABLET ORAL at 08:06

## 2021-06-15 RX ADMIN — DEXTROSE 500 ML: 5 SOLUTION INTRAVENOUS at 11:06

## 2021-06-15 RX ADMIN — POTASSIUM CHLORIDE 40 MEQ: 20 TABLET, EXTENDED RELEASE ORAL at 02:06

## 2021-06-15 RX ADMIN — LISINOPRIL 20 MG: 20 TABLET ORAL at 08:06

## 2021-06-15 RX ADMIN — CHLORHEXIDINE GLUCONATE 15 ML: 1.2 RINSE ORAL at 08:06

## 2021-06-15 RX ADMIN — GUAIFENESIN 1200 MG: 600 TABLET, EXTENDED RELEASE ORAL at 08:06

## 2021-06-15 RX ADMIN — MUPIROCIN: 20 OINTMENT TOPICAL at 08:06

## 2021-06-15 RX ADMIN — IPRATROPIUM BROMIDE AND ALBUTEROL SULFATE 3 ML: .5; 3 SOLUTION RESPIRATORY (INHALATION) at 07:06

## 2021-06-15 RX ADMIN — RISPERIDONE 4 MG: 1 TABLET ORAL at 08:06

## 2021-06-15 RX ADMIN — METOPROLOL SUCCINATE 50 MG: 50 TABLET, FILM COATED, EXTENDED RELEASE ORAL at 08:06

## 2021-06-15 RX ADMIN — CETIRIZINE HYDROCHLORIDE 10 MG: 10 TABLET, FILM COATED ORAL at 08:06

## 2021-06-15 RX ADMIN — DEXTROSE 500 ML: 5 SOLUTION INTRAVENOUS at 06:06

## 2021-06-16 VITALS
BODY MASS INDEX: 14.61 KG/M2 | WEIGHT: 93.06 LBS | HEIGHT: 67 IN | DIASTOLIC BLOOD PRESSURE: 68 MMHG | HEART RATE: 74 BPM | SYSTOLIC BLOOD PRESSURE: 123 MMHG | RESPIRATION RATE: 19 BRPM | OXYGEN SATURATION: 85 % | TEMPERATURE: 98 F

## 2021-06-16 LAB
ALBUMIN SERPL BCP-MCNC: 3.6 G/DL (ref 3.5–5.2)
ALP SERPL-CCNC: 78 U/L (ref 55–135)
ALT SERPL W/O P-5'-P-CCNC: 17 U/L (ref 10–44)
ANION GAP SERPL CALC-SCNC: 6 MMOL/L (ref 8–16)
ANION GAP SERPL CALC-SCNC: 7 MMOL/L (ref 8–16)
AST SERPL-CCNC: 28 U/L (ref 10–40)
BILIRUB SERPL-MCNC: 0.7 MG/DL (ref 0.1–1)
BUN SERPL-MCNC: 10 MG/DL (ref 8–23)
BUN SERPL-MCNC: 12 MG/DL (ref 8–23)
CALCIUM SERPL-MCNC: 8.5 MG/DL (ref 8.7–10.5)
CALCIUM SERPL-MCNC: 8.6 MG/DL (ref 8.7–10.5)
CHLORIDE SERPL-SCNC: 96 MMOL/L (ref 95–110)
CHLORIDE SERPL-SCNC: 97 MMOL/L (ref 95–110)
CO2 SERPL-SCNC: 28 MMOL/L (ref 23–29)
CO2 SERPL-SCNC: 28 MMOL/L (ref 23–29)
CREAT SERPL-MCNC: 0.6 MG/DL (ref 0.5–1.4)
CREAT SERPL-MCNC: 0.7 MG/DL (ref 0.5–1.4)
EST. GFR  (AFRICAN AMERICAN): >60 ML/MIN/1.73 M^2
EST. GFR  (AFRICAN AMERICAN): >60 ML/MIN/1.73 M^2
EST. GFR  (NON AFRICAN AMERICAN): >60 ML/MIN/1.73 M^2
EST. GFR  (NON AFRICAN AMERICAN): >60 ML/MIN/1.73 M^2
FERRITIN SERPL-MCNC: 179 NG/ML (ref 20–300)
GLUCOSE SERPL-MCNC: 100 MG/DL (ref 70–110)
GLUCOSE SERPL-MCNC: 82 MG/DL (ref 70–110)
IRON SERPL-MCNC: 43 UG/DL (ref 45–160)
MAGNESIUM SERPL-MCNC: 1.9 MG/DL (ref 1.6–2.6)
POTASSIUM SERPL-SCNC: 3.8 MMOL/L (ref 3.5–5.1)
POTASSIUM SERPL-SCNC: 4.3 MMOL/L (ref 3.5–5.1)
PROT SERPL-MCNC: 6.4 G/DL (ref 6–8.4)
SATURATED IRON: 16 % (ref 20–50)
SODIUM SERPL-SCNC: 131 MMOL/L (ref 136–145)
SODIUM SERPL-SCNC: 131 MMOL/L (ref 136–145)
TOTAL IRON BINDING CAPACITY: 269 UG/DL (ref 250–450)
TRANSFERRIN SERPL-MCNC: 192 MG/DL (ref 200–375)

## 2021-06-16 PROCEDURE — 83540 ASSAY OF IRON: CPT | Performed by: INTERNAL MEDICINE

## 2021-06-16 PROCEDURE — 25000003 PHARM REV CODE 250: Performed by: NURSE PRACTITIONER

## 2021-06-16 PROCEDURE — 80053 COMPREHEN METABOLIC PANEL: CPT | Performed by: NURSE PRACTITIONER

## 2021-06-16 PROCEDURE — 25000003 PHARM REV CODE 250

## 2021-06-16 PROCEDURE — 80048 BASIC METABOLIC PNL TOTAL CA: CPT | Performed by: INTERNAL MEDICINE

## 2021-06-16 PROCEDURE — 25000003 PHARM REV CODE 250: Performed by: STUDENT IN AN ORGANIZED HEALTH CARE EDUCATION/TRAINING PROGRAM

## 2021-06-16 PROCEDURE — 83735 ASSAY OF MAGNESIUM: CPT | Performed by: NURSE PRACTITIONER

## 2021-06-16 PROCEDURE — 82728 ASSAY OF FERRITIN: CPT | Performed by: INTERNAL MEDICINE

## 2021-06-16 RX ORDER — ASPIRIN 81 MG/1
81 TABLET ORAL DAILY
Qty: 30 TABLET | Refills: 0
Start: 2021-06-16 | End: 2021-07-16

## 2021-06-16 RX ORDER — ASPIRIN 81 MG/1
81 TABLET ORAL DAILY
Status: DISCONTINUED | OUTPATIENT
Start: 2021-06-16 | End: 2021-06-16 | Stop reason: HOSPADM

## 2021-06-16 RX ADMIN — DEXTROSE 500 ML: 5 SOLUTION INTRAVENOUS at 03:06

## 2021-06-16 RX ADMIN — MUPIROCIN: 20 OINTMENT TOPICAL at 08:06

## 2021-06-16 RX ADMIN — GUAIFENESIN 1200 MG: 600 TABLET, EXTENDED RELEASE ORAL at 08:06

## 2021-06-16 RX ADMIN — SODIUM CHLORIDE: 0.9 INJECTION, SOLUTION INTRAVENOUS at 10:06

## 2021-06-16 RX ADMIN — RISPERIDONE 4 MG: 1 TABLET ORAL at 08:06

## 2021-06-16 RX ADMIN — CHLORHEXIDINE GLUCONATE 15 ML: 1.2 RINSE ORAL at 08:06

## 2021-06-21 LAB — VIT B6 SERPL-MCNC: 5.3 UG/L (ref 5.3–46.7)

## 2021-06-22 LAB — VIT B1 BLD-SCNC: 144.2 NMOL/L (ref 66.5–200)

## 2022-01-04 ENCOUNTER — HOSPITAL ENCOUNTER (INPATIENT)
Facility: HOSPITAL | Age: 62
LOS: 3 days | Discharge: HOSPICE | DRG: 065 | End: 2022-01-07
Attending: EMERGENCY MEDICINE | Admitting: INTERNAL MEDICINE
Payer: OTHER GOVERNMENT

## 2022-01-04 DIAGNOSIS — I61.9 ICH (INTRACEREBRAL HEMORRHAGE): ICD-10-CM

## 2022-01-04 DIAGNOSIS — I62.9 INTRACRANIAL HEMORRHAGE: Primary | ICD-10-CM

## 2022-01-04 DIAGNOSIS — R56.9 SEIZURE: ICD-10-CM

## 2022-01-04 LAB
ABO + RH BLD: NORMAL
ALBUMIN SERPL BCP-MCNC: 4.6 G/DL (ref 3.5–5.2)
ALP SERPL-CCNC: 78 U/L (ref 55–135)
ALT SERPL W/O P-5'-P-CCNC: 18 U/L (ref 10–44)
ANION GAP SERPL CALC-SCNC: 13 MMOL/L (ref 8–16)
AST SERPL-CCNC: 32 U/L (ref 10–40)
BASOPHILS # BLD AUTO: 0.03 K/UL (ref 0–0.2)
BASOPHILS NFR BLD: 0.2 % (ref 0–1.9)
BILIRUB SERPL-MCNC: 0.9 MG/DL (ref 0.1–1)
BLD GP AB SCN CELLS X3 SERPL QL: NORMAL
BUN SERPL-MCNC: 11 MG/DL (ref 8–23)
CALCIUM SERPL-MCNC: 9.1 MG/DL (ref 8.7–10.5)
CHLORIDE SERPL-SCNC: 93 MMOL/L (ref 95–110)
CHOLEST SERPL-MCNC: 153 MG/DL (ref 120–199)
CHOLEST/HDLC SERPL: 2.7 {RATIO} (ref 2–5)
CO2 SERPL-SCNC: 24 MMOL/L (ref 23–29)
CREAT SERPL-MCNC: 0.8 MG/DL (ref 0.5–1.4)
DIFFERENTIAL METHOD: ABNORMAL
EOSINOPHIL # BLD AUTO: 0 K/UL (ref 0–0.5)
EOSINOPHIL NFR BLD: 0 % (ref 0–8)
ERYTHROCYTE [DISTWIDTH] IN BLOOD BY AUTOMATED COUNT: 12.3 % (ref 11.5–14.5)
EST. GFR  (AFRICAN AMERICAN): >60 ML/MIN/1.73 M^2
EST. GFR  (NON AFRICAN AMERICAN): >60 ML/MIN/1.73 M^2
ETHANOL SERPL-MCNC: <5 MG/DL
GLUCOSE SERPL-MCNC: 118 MG/DL (ref 70–110)
HCT VFR BLD AUTO: 38.1 % (ref 40–54)
HDLC SERPL-MCNC: 57 MG/DL (ref 40–75)
HDLC SERPL: 37.3 % (ref 20–50)
HGB BLD-MCNC: 12.9 G/DL (ref 14–18)
IMM GRANULOCYTES # BLD AUTO: 0.05 K/UL (ref 0–0.04)
IMM GRANULOCYTES NFR BLD AUTO: 0.4 % (ref 0–0.5)
LACTATE SERPL-SCNC: 1.6 MMOL/L (ref 0.5–1.9)
LACTATE SERPL-SCNC: 3.8 MMOL/L (ref 0.5–1.9)
LDLC SERPL CALC-MCNC: 89.2 MG/DL (ref 63–159)
LYMPHOCYTES # BLD AUTO: 0.6 K/UL (ref 1–4.8)
LYMPHOCYTES NFR BLD: 4.2 % (ref 18–48)
MCH RBC QN AUTO: 30.6 PG (ref 27–31)
MCHC RBC AUTO-ENTMCNC: 33.9 G/DL (ref 32–36)
MCV RBC AUTO: 90 FL (ref 82–98)
MONOCYTES # BLD AUTO: 0.7 K/UL (ref 0.3–1)
MONOCYTES NFR BLD: 4.8 % (ref 4–15)
NEUTROPHILS # BLD AUTO: 12.1 K/UL (ref 1.8–7.7)
NEUTROPHILS NFR BLD: 90.4 % (ref 38–73)
NONHDLC SERPL-MCNC: 96 MG/DL
NRBC BLD-RTO: 0 /100 WBC
PLATELET # BLD AUTO: 363 K/UL (ref 150–450)
PMV BLD AUTO: 8.9 FL (ref 9.2–12.9)
POTASSIUM SERPL-SCNC: 3.3 MMOL/L (ref 3.5–5.1)
PROT SERPL-MCNC: 7.9 G/DL (ref 6–8.4)
RBC # BLD AUTO: 4.22 M/UL (ref 4.6–6.2)
SARS-COV-2 RDRP RESP QL NAA+PROBE: NEGATIVE
SODIUM SERPL-SCNC: 130 MMOL/L (ref 136–145)
TRIGL SERPL-MCNC: 34 MG/DL (ref 30–150)
TSH SERPL DL<=0.005 MIU/L-ACNC: 1.19 UIU/ML (ref 0.34–5.6)
TSH SERPL DL<=0.005 MIU/L-ACNC: 1.19 UIU/ML (ref 0.34–5.6)
WBC # BLD AUTO: 13.42 K/UL (ref 3.9–12.7)

## 2022-01-04 PROCEDURE — 83605 ASSAY OF LACTIC ACID: CPT | Performed by: NURSE PRACTITIONER

## 2022-01-04 PROCEDURE — 25000003 PHARM REV CODE 250: Performed by: NURSE PRACTITIONER

## 2022-01-04 PROCEDURE — 80053 COMPREHEN METABOLIC PANEL: CPT | Performed by: EMERGENCY MEDICINE

## 2022-01-04 PROCEDURE — 96365 THER/PROPH/DIAG IV INF INIT: CPT

## 2022-01-04 PROCEDURE — 83036 HEMOGLOBIN GLYCOSYLATED A1C: CPT | Performed by: NURSE PRACTITIONER

## 2022-01-04 PROCEDURE — 63600175 PHARM REV CODE 636 W HCPCS: Performed by: NURSE PRACTITIONER

## 2022-01-04 PROCEDURE — U0002 COVID-19 LAB TEST NON-CDC: HCPCS | Performed by: EMERGENCY MEDICINE

## 2022-01-04 PROCEDURE — 84443 ASSAY THYROID STIM HORMONE: CPT | Performed by: NURSE PRACTITIONER

## 2022-01-04 PROCEDURE — 93010 EKG 12-LEAD: ICD-10-PCS | Mod: ,,, | Performed by: INTERNAL MEDICINE

## 2022-01-04 PROCEDURE — 20000000 HC ICU ROOM

## 2022-01-04 PROCEDURE — 86901 BLOOD TYPING SEROLOGIC RH(D): CPT | Performed by: NURSE PRACTITIONER

## 2022-01-04 PROCEDURE — 82077 ASSAY SPEC XCP UR&BREATH IA: CPT | Performed by: EMERGENCY MEDICINE

## 2022-01-04 PROCEDURE — 63600175 PHARM REV CODE 636 W HCPCS: Performed by: EMERGENCY MEDICINE

## 2022-01-04 PROCEDURE — 96375 TX/PRO/DX INJ NEW DRUG ADDON: CPT

## 2022-01-04 PROCEDURE — 93010 ELECTROCARDIOGRAM REPORT: CPT | Mod: ,,, | Performed by: INTERNAL MEDICINE

## 2022-01-04 PROCEDURE — 99285 EMERGENCY DEPT VISIT HI MDM: CPT | Mod: 25

## 2022-01-04 PROCEDURE — 93005 ELECTROCARDIOGRAM TRACING: CPT | Performed by: INTERNAL MEDICINE

## 2022-01-04 PROCEDURE — 85025 COMPLETE CBC W/AUTO DIFF WBC: CPT | Performed by: EMERGENCY MEDICINE

## 2022-01-04 PROCEDURE — 36415 COLL VENOUS BLD VENIPUNCTURE: CPT | Performed by: NURSE PRACTITIONER

## 2022-01-04 PROCEDURE — 99222 1ST HOSP IP/OBS MODERATE 55: CPT | Mod: ,,, | Performed by: NEUROLOGICAL SURGERY

## 2022-01-04 PROCEDURE — 83605 ASSAY OF LACTIC ACID: CPT | Mod: 91 | Performed by: EMERGENCY MEDICINE

## 2022-01-04 PROCEDURE — 80061 LIPID PANEL: CPT | Performed by: NURSE PRACTITIONER

## 2022-01-04 PROCEDURE — 80307 DRUG TEST PRSMV CHEM ANLYZR: CPT | Performed by: EMERGENCY MEDICINE

## 2022-01-04 PROCEDURE — 25000003 PHARM REV CODE 250: Performed by: EMERGENCY MEDICINE

## 2022-01-04 PROCEDURE — 99222 PR INITIAL HOSPITAL CARE,LEVL II: ICD-10-PCS | Mod: ,,, | Performed by: NEUROLOGICAL SURGERY

## 2022-01-04 RX ORDER — ATORVASTATIN CALCIUM 40 MG/1
40 TABLET, FILM COATED ORAL DAILY
Status: DISCONTINUED | OUTPATIENT
Start: 2022-01-05 | End: 2022-01-07 | Stop reason: HOSPADM

## 2022-01-04 RX ORDER — POTASSIUM CHLORIDE 7.45 MG/ML
80 INJECTION INTRAVENOUS
Status: DISCONTINUED | OUTPATIENT
Start: 2022-01-04 | End: 2022-01-07 | Stop reason: HOSPADM

## 2022-01-04 RX ORDER — MAGNESIUM SULFATE HEPTAHYDRATE 40 MG/ML
4 INJECTION, SOLUTION INTRAVENOUS
Status: DISCONTINUED | OUTPATIENT
Start: 2022-01-04 | End: 2022-01-07 | Stop reason: HOSPADM

## 2022-01-04 RX ORDER — LISINOPRIL 20 MG/1
20 TABLET ORAL DAILY
Status: DISCONTINUED | OUTPATIENT
Start: 2022-01-05 | End: 2022-01-07 | Stop reason: HOSPADM

## 2022-01-04 RX ORDER — ONDANSETRON 2 MG/ML
4 INJECTION INTRAMUSCULAR; INTRAVENOUS EVERY 8 HOURS PRN
Status: DISCONTINUED | OUTPATIENT
Start: 2022-01-04 | End: 2022-01-07 | Stop reason: HOSPADM

## 2022-01-04 RX ORDER — ACETAMINOPHEN 325 MG/1
650 TABLET ORAL EVERY 6 HOURS PRN
Status: DISCONTINUED | OUTPATIENT
Start: 2022-01-04 | End: 2022-01-07 | Stop reason: HOSPADM

## 2022-01-04 RX ORDER — MAGNESIUM SULFATE HEPTAHYDRATE 40 MG/ML
2 INJECTION, SOLUTION INTRAVENOUS
Status: DISCONTINUED | OUTPATIENT
Start: 2022-01-04 | End: 2022-01-07 | Stop reason: HOSPADM

## 2022-01-04 RX ORDER — METOPROLOL SUCCINATE 50 MG/1
50 TABLET, EXTENDED RELEASE ORAL DAILY
Status: DISCONTINUED | OUTPATIENT
Start: 2022-01-05 | End: 2022-01-07 | Stop reason: HOSPADM

## 2022-01-04 RX ORDER — POTASSIUM CHLORIDE 7.45 MG/ML
40 INJECTION INTRAVENOUS
Status: DISCONTINUED | OUTPATIENT
Start: 2022-01-04 | End: 2022-01-07 | Stop reason: HOSPADM

## 2022-01-04 RX ORDER — LEVETIRACETAM 10 MG/ML
1000 INJECTION INTRAVASCULAR
Status: COMPLETED | OUTPATIENT
Start: 2022-01-04 | End: 2022-01-04

## 2022-01-04 RX ORDER — LEVETIRACETAM 500 MG/5ML
500 INJECTION, SOLUTION, CONCENTRATE INTRAVENOUS EVERY 12 HOURS
Status: CANCELLED | OUTPATIENT
Start: 2022-01-04

## 2022-01-04 RX ORDER — FAMOTIDINE 10 MG/ML
20 INJECTION INTRAVENOUS DAILY
Status: DISCONTINUED | OUTPATIENT
Start: 2022-01-05 | End: 2022-01-07 | Stop reason: HOSPADM

## 2022-01-04 RX ORDER — SODIUM CHLORIDE 0.9 % (FLUSH) 0.9 %
10 SYRINGE (ML) INJECTION
Status: DISCONTINUED | OUTPATIENT
Start: 2022-01-04 | End: 2022-01-07 | Stop reason: HOSPADM

## 2022-01-04 RX ORDER — POTASSIUM CHLORIDE 7.45 MG/ML
60 INJECTION INTRAVENOUS
Status: DISCONTINUED | OUTPATIENT
Start: 2022-01-04 | End: 2022-01-07 | Stop reason: HOSPADM

## 2022-01-04 RX ORDER — NICARDIPINE HYDROCHLORIDE 0.2 MG/ML
0-15 INJECTION INTRAVENOUS CONTINUOUS
Status: DISCONTINUED | OUTPATIENT
Start: 2022-01-04 | End: 2022-01-06

## 2022-01-04 RX ORDER — RISPERIDONE 1 MG/1
4 TABLET ORAL 2 TIMES DAILY
Status: DISCONTINUED | OUTPATIENT
Start: 2022-01-04 | End: 2022-01-07 | Stop reason: HOSPADM

## 2022-01-04 RX ADMIN — PIPERACILLIN AND TAZOBACTAM 3.38 G: 3; .375 INJECTION, POWDER, LYOPHILIZED, FOR SOLUTION INTRAVENOUS; PARENTERAL at 11:01

## 2022-01-04 RX ADMIN — NICARDIPINE HYDROCHLORIDE 1 MG/HR: 0.2 INJECTION INTRAVENOUS at 06:01

## 2022-01-04 RX ADMIN — LEVETIRACETAM INJECTION 1000 MG: 10 INJECTION INTRAVENOUS at 06:01

## 2022-01-05 LAB
ALBUMIN SERPL BCP-MCNC: 4.7 G/DL (ref 3.5–5.2)
ALP SERPL-CCNC: 77 U/L (ref 55–135)
ALT SERPL W/O P-5'-P-CCNC: 20 U/L (ref 10–44)
ANION GAP SERPL CALC-SCNC: 11 MMOL/L (ref 8–16)
ANION GAP SERPL CALC-SCNC: 15 MMOL/L (ref 8–16)
APTT PPP: 32 SEC (ref 23.3–35.1)
AST SERPL-CCNC: 30 U/L (ref 10–40)
BASOPHILS # BLD AUTO: 0.03 K/UL (ref 0–0.2)
BASOPHILS NFR BLD: 0.2 % (ref 0–1.9)
BILIRUB SERPL-MCNC: 1.2 MG/DL (ref 0.1–1)
BILIRUB UR QL STRIP: NEGATIVE
BUN SERPL-MCNC: 7 MG/DL (ref 8–23)
BUN SERPL-MCNC: 8 MG/DL (ref 8–23)
BUN SERPL-MCNC: 8 MG/DL (ref 8–23)
BUN SERPL-MCNC: 9 MG/DL (ref 8–23)
CALCIUM SERPL-MCNC: 8.6 MG/DL (ref 8.7–10.5)
CALCIUM SERPL-MCNC: 8.9 MG/DL (ref 8.7–10.5)
CALCIUM SERPL-MCNC: 9.2 MG/DL (ref 8.7–10.5)
CALCIUM SERPL-MCNC: 9.2 MG/DL (ref 8.7–10.5)
CHLORIDE SERPL-SCNC: 90 MMOL/L (ref 95–110)
CHLORIDE SERPL-SCNC: 92 MMOL/L (ref 95–110)
CHLORIDE SERPL-SCNC: 93 MMOL/L (ref 95–110)
CHLORIDE SERPL-SCNC: 96 MMOL/L (ref 95–110)
CK MB SERPL-MCNC: 8.2 NG/ML (ref 0.1–6.5)
CLARITY UR: CLEAR
CO2 SERPL-SCNC: 21 MMOL/L (ref 23–29)
CO2 SERPL-SCNC: 23 MMOL/L (ref 23–29)
CO2 SERPL-SCNC: 24 MMOL/L (ref 23–29)
CO2 SERPL-SCNC: 26 MMOL/L (ref 23–29)
COLOR UR: COLORLESS
CREAT SERPL-MCNC: 0.6 MG/DL (ref 0.5–1.4)
CREAT SERPL-MCNC: 0.7 MG/DL (ref 0.5–1.4)
DIFFERENTIAL METHOD: ABNORMAL
EOSINOPHIL # BLD AUTO: 0 K/UL (ref 0–0.5)
EOSINOPHIL NFR BLD: 0 % (ref 0–8)
ERYTHROCYTE [DISTWIDTH] IN BLOOD BY AUTOMATED COUNT: 12.4 % (ref 11.5–14.5)
EST. GFR  (AFRICAN AMERICAN): >60 ML/MIN/1.73 M^2
EST. GFR  (NON AFRICAN AMERICAN): >60 ML/MIN/1.73 M^2
ESTIMATED AVG GLUCOSE: 103 MG/DL (ref 68–131)
GLUCOSE SERPL-MCNC: 101 MG/DL (ref 70–110)
GLUCOSE SERPL-MCNC: 120 MG/DL (ref 70–110)
GLUCOSE SERPL-MCNC: 83 MG/DL (ref 70–110)
GLUCOSE SERPL-MCNC: 91 MG/DL (ref 70–110)
GLUCOSE UR QL STRIP: NEGATIVE
HBA1C MFR BLD: 5.2 % (ref 4.5–6.2)
HCT VFR BLD AUTO: 39.8 % (ref 40–54)
HGB BLD-MCNC: 13.3 G/DL (ref 14–18)
HGB UR QL STRIP: NEGATIVE
IMM GRANULOCYTES # BLD AUTO: 0.06 K/UL (ref 0–0.04)
IMM GRANULOCYTES NFR BLD AUTO: 0.4 % (ref 0–0.5)
INR PPP: 1
KETONES UR QL STRIP: ABNORMAL
LEUKOCYTE ESTERASE UR QL STRIP: NEGATIVE
LYMPHOCYTES # BLD AUTO: 1.3 K/UL (ref 1–4.8)
LYMPHOCYTES NFR BLD: 9.6 % (ref 18–48)
MAGNESIUM SERPL-MCNC: 1.9 MG/DL (ref 1.6–2.6)
MCH RBC QN AUTO: 30 PG (ref 27–31)
MCHC RBC AUTO-ENTMCNC: 33.4 G/DL (ref 32–36)
MCV RBC AUTO: 90 FL (ref 82–98)
MONOCYTES # BLD AUTO: 1 K/UL (ref 0.3–1)
MONOCYTES NFR BLD: 7.1 % (ref 4–15)
NEUTROPHILS # BLD AUTO: 11.2 K/UL (ref 1.8–7.7)
NEUTROPHILS NFR BLD: 82.7 % (ref 38–73)
NITRITE UR QL STRIP: NEGATIVE
NRBC BLD-RTO: 0 /100 WBC
PH UR STRIP: 7 [PH] (ref 5–8)
PHOSPHATE SERPL-MCNC: 3.2 MG/DL (ref 2.7–4.5)
PLATELET # BLD AUTO: 360 K/UL (ref 150–450)
PMV BLD AUTO: 9.2 FL (ref 9.2–12.9)
POTASSIUM SERPL-SCNC: 3.2 MMOL/L (ref 3.5–5.1)
POTASSIUM SERPL-SCNC: 3.6 MMOL/L (ref 3.5–5.1)
POTASSIUM SERPL-SCNC: 3.8 MMOL/L (ref 3.5–5.1)
POTASSIUM SERPL-SCNC: 4.7 MMOL/L (ref 3.5–5.1)
PROT SERPL-MCNC: 8 G/DL (ref 6–8.4)
PROT UR QL STRIP: NEGATIVE
PROTHROMBIN TIME: 12.8 SEC (ref 11.4–13.7)
RBC # BLD AUTO: 4.43 M/UL (ref 4.6–6.2)
SODIUM SERPL-SCNC: 128 MMOL/L (ref 136–145)
SODIUM SERPL-SCNC: 129 MMOL/L (ref 136–145)
SP GR UR STRIP: 1.01 (ref 1–1.03)
TROPONIN I SERPL DL<=0.01 NG/ML-MCNC: <0.03 NG/ML
URN SPEC COLLECT METH UR: ABNORMAL
UROBILINOGEN UR STRIP-ACNC: NEGATIVE EU/DL
WBC # BLD AUTO: 13.58 K/UL (ref 3.9–12.7)

## 2022-01-05 PROCEDURE — 94799 UNLISTED PULMONARY SVC/PX: CPT

## 2022-01-05 PROCEDURE — 63600175 PHARM REV CODE 636 W HCPCS: Performed by: NURSE PRACTITIONER

## 2022-01-05 PROCEDURE — 25500020 PHARM REV CODE 255: Performed by: FAMILY MEDICINE

## 2022-01-05 PROCEDURE — 25000003 PHARM REV CODE 250: Performed by: EMERGENCY MEDICINE

## 2022-01-05 PROCEDURE — 80048 BASIC METABOLIC PNL TOTAL CA: CPT | Mod: 91 | Performed by: FAMILY MEDICINE

## 2022-01-05 PROCEDURE — 80053 COMPREHEN METABOLIC PANEL: CPT | Performed by: NURSE PRACTITIONER

## 2022-01-05 PROCEDURE — 84484 ASSAY OF TROPONIN QUANT: CPT | Performed by: NURSE PRACTITIONER

## 2022-01-05 PROCEDURE — 63600175 PHARM REV CODE 636 W HCPCS: Performed by: FAMILY MEDICINE

## 2022-01-05 PROCEDURE — 63600175 PHARM REV CODE 636 W HCPCS: Performed by: INTERNAL MEDICINE

## 2022-01-05 PROCEDURE — 85730 THROMBOPLASTIN TIME PARTIAL: CPT | Performed by: NURSE PRACTITIONER

## 2022-01-05 PROCEDURE — 83735 ASSAY OF MAGNESIUM: CPT | Performed by: NURSE PRACTITIONER

## 2022-01-05 PROCEDURE — 82553 CREATINE MB FRACTION: CPT | Performed by: NURSE PRACTITIONER

## 2022-01-05 PROCEDURE — 85610 PROTHROMBIN TIME: CPT | Performed by: NURSE PRACTITIONER

## 2022-01-05 PROCEDURE — 92610 EVALUATE SWALLOWING FUNCTION: CPT

## 2022-01-05 PROCEDURE — 94761 N-INVAS EAR/PLS OXIMETRY MLT: CPT

## 2022-01-05 PROCEDURE — 85025 COMPLETE CBC W/AUTO DIFF WBC: CPT | Performed by: NURSE PRACTITIONER

## 2022-01-05 PROCEDURE — 36415 COLL VENOUS BLD VENIPUNCTURE: CPT | Performed by: FAMILY MEDICINE

## 2022-01-05 PROCEDURE — 20000000 HC ICU ROOM

## 2022-01-05 PROCEDURE — 25000003 PHARM REV CODE 250: Performed by: NURSE PRACTITIONER

## 2022-01-05 PROCEDURE — 36415 COLL VENOUS BLD VENIPUNCTURE: CPT | Performed by: NURSE PRACTITIONER

## 2022-01-05 PROCEDURE — 81003 URINALYSIS AUTO W/O SCOPE: CPT | Performed by: NURSE PRACTITIONER

## 2022-01-05 PROCEDURE — 84100 ASSAY OF PHOSPHORUS: CPT | Performed by: NURSE PRACTITIONER

## 2022-01-05 PROCEDURE — 99900035 HC TECH TIME PER 15 MIN (STAT)

## 2022-01-05 RX ORDER — CHLORHEXIDINE GLUCONATE ORAL RINSE 1.2 MG/ML
15 SOLUTION DENTAL 2 TIMES DAILY
Status: DISCONTINUED | OUTPATIENT
Start: 2022-01-05 | End: 2022-01-07 | Stop reason: HOSPADM

## 2022-01-05 RX ORDER — LEVETIRACETAM 5 MG/ML
500 INJECTION INTRAVASCULAR EVERY 12 HOURS
Status: DISCONTINUED | OUTPATIENT
Start: 2022-01-05 | End: 2022-01-07 | Stop reason: HOSPADM

## 2022-01-05 RX ORDER — MUPIROCIN 20 MG/G
OINTMENT TOPICAL 2 TIMES DAILY
Status: DISCONTINUED | OUTPATIENT
Start: 2022-01-05 | End: 2022-01-07 | Stop reason: HOSPADM

## 2022-01-05 RX ORDER — 3% SODIUM CHLORIDE 3 G/100ML
35 INJECTION, SOLUTION INTRAVENOUS CONTINUOUS
Status: DISCONTINUED | OUTPATIENT
Start: 2022-01-05 | End: 2022-01-06

## 2022-01-05 RX ADMIN — IOHEXOL 100 ML: 350 INJECTION, SOLUTION INTRAVENOUS at 02:01

## 2022-01-05 RX ADMIN — NICARDIPINE HYDROCHLORIDE 2.5 MG/HR: 0.2 INJECTION INTRAVENOUS at 10:01

## 2022-01-05 RX ADMIN — SODIUM CHLORIDE 50 ML/HR: 3 INJECTION, SOLUTION INTRAVENOUS at 11:01

## 2022-01-05 RX ADMIN — ATORVASTATIN CALCIUM 40 MG: 40 TABLET, FILM COATED ORAL at 03:01

## 2022-01-05 RX ADMIN — LISINOPRIL 20 MG: 20 TABLET ORAL at 03:01

## 2022-01-05 RX ADMIN — METOPROLOL SUCCINATE 50 MG: 50 TABLET, FILM COATED, EXTENDED RELEASE ORAL at 03:01

## 2022-01-05 RX ADMIN — SODIUM CHLORIDE 15 ML/HR: 3 INJECTION, SOLUTION INTRAVENOUS at 10:01

## 2022-01-05 RX ADMIN — RISPERIDONE 4 MG: 1 TABLET ORAL at 08:01

## 2022-01-05 RX ADMIN — LEVETIRACETAM INJECTION 500 MG: 5 INJECTION INTRAVENOUS at 08:01

## 2022-01-05 RX ADMIN — LEVETIRACETAM INJECTION 500 MG: 5 INJECTION INTRAVENOUS at 09:01

## 2022-01-05 RX ADMIN — POTASSIUM CHLORIDE 60 MEQ: 7.46 INJECTION, SOLUTION INTRAVENOUS at 05:01

## 2022-01-05 RX ADMIN — FAMOTIDINE 20 MG: 10 INJECTION, SOLUTION INTRAVENOUS at 09:01

## 2022-01-05 RX ADMIN — PIPERACILLIN AND TAZOBACTAM 3.38 G: 3; .375 INJECTION, POWDER, LYOPHILIZED, FOR SOLUTION INTRAVENOUS; PARENTERAL at 06:01

## 2022-01-05 NOTE — NURSING
Pt in bed partially opens eyes when spoken to.  Nonverbal.  Does not follow commands.  Strength equal bilaterally in all extremities.  HOB @ 45 degrees.  Call light within reach.  Seizure precautions maintained. Will continue to monitor.

## 2022-01-05 NOTE — PT/OT/SLP PROGRESS
Occupational Therapy      Patient Name:  Pa Gallegos   MRN:  [id number]    Patient not seen today secondary to Other (Comment) (RN hold.). Will follow-up next service date.    1/5/2022

## 2022-01-05 NOTE — PT/OT/SLP EVAL
Speech Language Pathology Evaluation  Bedside Swallow    Patient Name:  Pa Gallegos   MRN:  [id number]  Admitting Diagnosis: Intracranial hemorrhage    Recommendations:                 General Recommendations:    · Oral medications buried in puree texture, crush if needed. Allow sips of water for comfort.   · Will follow in AM to determine if Pt is appropriate for oral feeding/meal trays     Diet recommendations:  · NPO    Aspiration Precautions: Meds whole buried in puree, Meds crushed in puree (if needed)      General Precautions: Standard, aspiration  Communication strategies:  unable to provide at this time    History:     Patient information was obtained from patient, past medical records and ER records.     Pa Gallegos is a 61 y.o. old  male who  has a past medical history of Colonic polyp, Dyslipidemia, GERD (gastroesophageal reflux disease), Hypertension, Hyponatremia, Schizoaffective disorder, and Seizures.. The patient presented to [facility] on 1/4/2022 with a primary complaint of Seizures (Patient had 2 seizures, hx of seizures )  .   61-year-old  male presents to the emergency room for possible seizure.  HPI obtained from ER physician and ED nurse.  This patient has a past medical history significant for schizophrenia, hypertension, chronic hyponatremia, hyperlipidemia and seizures     The patient presented to the ED today with possible seizure activity.  It is unknown whether he had a seizure or if he fell and hit him head his head.  Nonetheless a CT of the head was performed revealing a large intracranial hemorrhage with a right midline shift.     According to the notes neuro surgery Dr. Huddleston was consulted and saw the patient at the bedside.  The ER physician stated that he had a long discussion with the family and they did not want any aggressive treatment for his intracranial hemorrhage.  They wanted only medical management.  The patient is not a DNR however.     On my exam  "the patient was awake and alert.  He was nonverbal and did not follow commands.  His lower extremities were contracted but he did move his upper extremities without difficulty.  His pupillary response was equal and symmetrical bilaterally.  I am not certain of his baseline but on his last admit on H&P it was noted that the patient does have a speech impediment     Nonetheless the patient was started on a nicardipine drip and he was given Keppra in the ED.     He will be admitted to the ICU for ICH management      Past Medical History:   Diagnosis Date    Colonic polyp     Dyslipidemia     GERD (gastroesophageal reflux disease)     Hypertension     Hyponatremia     Schizoaffective disorder     Seizures        Past Surgical History:   Procedure Laterality Date    COLONOSCOPY N/A 1/6/2016    Procedure: COLONOSCOPY;  Surgeon: Coni Navas MD;  Location: [facility];  Service: Endoscopy;  Laterality: N/A;         Subjective     Pt alert, family at bedside   Family promotes coughing with oral intake at baseline   Family describes baseline "stutter"   Patient goals: none stated      Pain/Comfort:  · Pain Rating 1: 0/10    Respiratory Status: Room air    Objective:     Cognitive status: Alerted with min stimulation. Variable visual tracking of communication partner. Unreliable Y/N response to factual information, perseveration of "yeah". Follows simple commands at <60% accuracy. Unable to communicate wants and needs reliably or accurately. Fluent and appropriate word to phrase level responses only automatic in nature in context of social greeting.     Oral Musculature Evaluation  · Dentition: teeth in poor condition  · Secretion Management: adequate  · Mucosal Quality: good  · Volitional Cough: unable to elicit  · Volitional Swallow: unable to elicit  · Voice Prior to PO Intake: clear in nature, reduced vocal intensity  · Oral Musculature Comments: Evident muscle wasting. Assessment limited due to mental " "status.    Bedside Swallow Eval:   Consistencies Assessed:  · Thin liquids ice chip, teaspoon and cup edge with [place] assist   · Puree teaspoon x5     Oral Phase:   · Impaired coordination to retreive from cup and teaspoon, unable to siphon straw  · Sputtering of liquid from cup edge and utensil   · Decreased awareness of bolus with inconsistent manipulation and a-p transit  · No appreciable oral residue given assist with liquid wash     Pharyngeal Phase:   · Immediate cough with liquid presentation from cup edge: wet in nature   · Multiple swallows per bolus: 3-5. May indicate presence of penetration or pharyngeal residue     Assessment:     Pa Gallegos is a 61 y.o. male with an SLP diagnosis of impaired communication and signs of dysphagia following ICH. Family describes degree of dysphagia at baseline with reported coughing during oral intake as well as baseline "stutter". Will advance diet with anticipation of improved mental status. In the interim, recommend oral presentations be limited to medication and sips of water for comfort.     Goals:   Multidisciplinary Problems     SLP Goals        Problem: SLP Goal    Goal Priority Disciplines Outcome   SLP Goal     SLP Ongoing, Progressing   Description: 1. Tolerate cup edge sips of liquid w/out overt sign of aspiration or airway threat  2. Demonstrate prompt a-p transit of puree solids   3. Demonstrate onset of active manipulation and preparation of soft solid with ability to achieve cohesive bolus   4. Follow single step commands at 80% acc  5. Demonstrate orientation x3                   Plan:     · Patient to be seen:  5 x/week   · Plan of Care expires:     · Plan of Care reviewed with:  patient,family   · SLP Follow-Up:  Yes       Discharge recommendations:  Pending     Time Tracking:     SLP Treatment Date:   01/05/22  Speech Start Time:  1320  Speech Stop Time:  1333     Speech Total Time (min):  13 min    Billable Minutes: Eval Swallow and Oral Function " 13.    01/05/2022

## 2022-01-05 NOTE — PROGRESS NOTES
[facility] Medicine  Progress Note    Patient name: Pa Gallegos  MRN: [id number]  Admit Date: 1/4/2022   LOS: 1 day     SUBJECTIVE:     Principal problem: Intracranial hemorrhage    Interval History:   Patient is nonverbal. Only has mouth movements that is incomprehensible.  Not able to write or respond to basic yes/no questions.  Father at bedside.  Discussed case at length.  Father would like to change patient to DNR.  Father reports that patient has been having the same mental status for the past 40 years.  Only smokes outside.  Nursing reports that patient attempted to climb out of bed this morning.  Sodium worsened this morning started on hypertonic saline.  Discontinue antibiotics.  Case discussed with Neurology, Dr. Dubon.  ST to evaluate patient.    Hospital course  Patient admitted for large acute intraparenchymal hemorrhage with midline shift.  Patient was placed in ICU with close monitoring.  Family did not want to pursue aggressive surgical intervention.  Neurosurgery recommended medical management.  Patient was started on hypertonic saline with worsening sodium and IV anti hypertensives.     Scheduled Meds:   atorvastatin  40 mg Oral Daily    chlorhexidine  15 mL Mouth/Throat BID    famotidine (PF)  20 mg Intravenous Daily    levetiracetam IV  500 mg Intravenous Q12H    lisinopriL  20 mg Oral Daily    metoprolol succinate  50 mg Oral Daily    mupirocin   Nasal BID    risperiDONE  4 mg Oral BID     Continuous Infusions:   niCARdipine 2.5 mg/hr (01/05/22 1030)    sodium chloride 3% 15 mL/hr (01/05/22 1028)     PRN Meds:acetaminophen, calcium gluconate IVPB, calcium gluconate IVPB, calcium gluconate IVPB, magnesium sulfate IVPB, magnesium sulfate IVPB, ondansetron, potassium chloride in water **AND** potassium chloride in water **AND** potassium chloride in water, sodium chloride 0.9%    Review of patient's allergies indicates:  No Known Allergies    Review of  Systems  Unable to obtain due to chronic mental debility    OBJECTIVE:     Vital Signs (Most Recent)  Temp: 98.9 °F (37.2 °C) (01/05/22 1101)  Pulse: 85 (01/05/22 1300)  Resp: 18 (01/05/22 1300)  BP: 124/71 (01/05/22 1300)  SpO2: 100 % (01/05/22 1300)    Vital Signs Range (Last 24H):  Temp:  [98.4 °F (36.9 °C)-98.9 °F (37.2 °C)]   Pulse:  []   Resp:  [16-28]   BP: (117-207)/(64-96)   SpO2:  [92 %-100 %]     I & O (Last 24H):    Intake/Output Summary (Last 24 hours) at 1/5/2022 1503  Last data filed at 1/5/2022 0621  Gross per 24 hour   Intake 110 ml   Output 1600 ml   Net -1490 ml       Physical Exam:  General: Patient resting comfortably in no acute distress. Appears as stated age. Calm. Restraints, varghese.  Cachectic, chronic ill-appearing.   Eyes: EOM intact. No conjunctivae injection. No scleral icterus.  ENT: Hearing grossly intact. No discharge from ears. No nasal discharge.   CVS: RRR. No LE edema BL.  Lungs: CTA BL, no wheezing or crackles. Good breath sounds. No accessory muscle use. No acute respiratory distress  Neuro: Alert. Cranial nerves grossly intact. Moves all extremities equally.  Does not follow commands or respond to questions appropriately.  Nonverbal.    Laboratory:  All pertinent labs within the past 24 hours have been reviewed.  CBC:   Recent Labs   Lab 01/04/22 1736 01/05/22 0251   WBC 13.42* 13.58*   HGB 12.9* 13.3*   HCT 38.1* 39.8*    360     CMP:   Recent Labs   Lab 01/04/22  1736 01/05/22  0251 01/05/22  1238   * 128* 129*   K 3.3* 3.2* 3.6   CL 93* 90* 92*   CO2 24 23 26   * 120* 91   BUN 11 7* 8   CREATININE 0.8 0.6 0.6   CALCIUM 9.1 9.2 9.2   PROT 7.9 8.0  --    ALBUMIN 4.6 4.7  --    BILITOT 0.9 1.2*  --    ALKPHOS 78 77  --    AST 32 30  --    ALT 18 20  --    ANIONGAP 13 15 11   EGFRNONAA >60.0 >60.0 >60.0       Microbiology Results (last 7 days)     ** No results found for the last 168 hours. **           Diagnostic Results:      ASSESSMENT/PLAN:      Active Hospital Problems    Diagnosis  POA    *Intracranial hemorrhage [I62.9]  Yes    Seizure [R56.9]  Yes    Hyponatremia [E87.1]  Yes    Essential hypertension [I10]  Yes      Resolved Hospital Problems   No resolved problems to display.           Plan:   ICU  Family decided on medical management only  Neuro check  Elevate head of bed  Hypertonic saline.  Sodium goal 135-145  BMP q.4 hours  Empiric Keppra IV  Seizure precautions  Cardizem drip to maintain BP less than 140  Repeat head CT, CTA head and neck pending  PT/OT/ST  Continue medications    Neurosurgery consult  Neurology consult    DNR    VTE Risk Mitigation (From admission, onward)         Ordered     IP VTE LOW RISK PATIENT  Once         01/04/22 2017     Place sequential compression device  Until discontinued         01/04/22 2017                        Patient care time was spent personally by me on the following activities: > 35 min  · Obtaining a history.  · Examination of patient.  · Providing medical care at the patients bedside.  · Developing a treatment plan with patient or surrogate and bedside caregivers.  · Ordering and reviewing laboratory studies, radiographic studies, pulse oximetry.  · Ordering and performing treatments and interventions.  · Evaluation of patient's response to treatment.  · Discussions with consultants while on the unit and immediately available to the patient.  · Re-evaluation of the patient's condition.  · Documentation in the medical record.       Department Hospital Medicine  [facility]  Ajit Patel MD    Please note: This note was transcribed using voice recognition software. Because of this technology, there are often uinintended grammatical, spelling, and other transcription errors. Please disregard these errors.

## 2022-01-05 NOTE — CONSULTS
[facility]  Department of Neurology  Neurology Consultation Note        PATIENT NAME: Pa Gallegos  MRN: [id number]  CSN: [id number]      TODAY'S DATE: 01/05/2022  ADMIT DATE: 1/4/2022                            CONSULTING PROVIDER: Riccardo Dubon MD  CONSULT REQUESTED BY: Ajit Patel MD      Reason for consult: ICH]    History obtained from chart review.    HPI per EMR: Pa Gallegos is a 61 y.o. male with a history of Colonic polyp, Dyslipidemia, GERD (gastroesophageal reflux disease), Hypertension, Hyponatremia, Schizoaffective disorder, and Seizures.. The patient presented to [facility] on 1/4/2022 with a primary complaint of Seizures.     It is unknown whether he had a seizure or if he fell and hit him head his head.  Nonetheless a CT of the head was performed revealing a large intracranial hemorrhage with a right midline shift.  According to the notes neuro surgery Dr. Huddleston was consulted and saw the patient at the bedside. The ER physician stated that he had a long discussion with the family and they did not want any aggressive treatment for his intracranial hemorrhage.  They wanted only medical management. The patient is not a DNR however. On my exam the patient was awake and alert.  He was nonverbal and did not follow commands.  His lower extremities were contracted but he did move his upper extremities without difficulty.  His pupillary response was equal and symmetrical bilaterally.  I am not certain of his baseline but on his last admit on H&P it was noted that the patient does have a speech impediment. Nonetheless the patient was started on a nicardipine drip and he was given Keppra in the ED.    Neurology consult:  Patient was seen and examined by me in the ICU.  Patient is nonverbal.  On exam, Right upper and lower extremity and increased tone however he is able to move antigravity.  Spontaneous movements of the left upper and lower extremity.  He is awake and tracks  however does not follow to verbal commands.     PREVIOUS MEDICAL HISTORY:  Past Medical History:   Diagnosis Date    Colonic polyp     Dyslipidemia     GERD (gastroesophageal reflux disease)     Hypertension     Hyponatremia     Schizoaffective disorder     Seizures      PREVIOUS SURGICAL HISTORY:  Past Surgical History:   Procedure Laterality Date    COLONOSCOPY N/A 1/6/2016    Procedure: COLONOSCOPY;  Surgeon: Coni Navas MD;  Location: [facility];  Service: Endoscopy;  Laterality: N/A;     FAMILY MEDICAL HISTORY:  Family History   Problem Relation Age of Onset    Lung cancer Father      SOCIAL HISTORY:  Social History     Tobacco Use    Smoking status: Current Every Day Smoker     Packs/day: 2.00     Types: Cigarettes   Substance Use Topics    Alcohol use: No    Drug use: Not Currently     ALLERGIES:  Review of patient's allergies indicates:  No Known Allergies  HOME MEDICATIONS:  Prior to Admission medications    Medication Sig Start Date End Date Taking? Authorizing Provider   amLODIPine (NORVASC) 10 MG tablet Take 10 mg by mouth once daily.    Historical Provider   aspirin (ECOTRIN) 81 MG EC tablet Take 1 tablet (81 mg total) by mouth once daily. 6/16/21 7/16/21  Ramon Mayfield DO   guaifenesin (MUCINEX) 600 mg 12 hr tablet Take 1,200 mg by mouth 2 (two) times daily.    Historical Provider   lisinopril (PRINIVIL,ZESTRIL) 40 MG tablet Take 20 mg by mouth once daily.     Historical Provider   loratadine (CLARITIN) 10 mg tablet Take 10 mg by mouth once daily.    Historical Provider   metoprolol succinate (TOPROL-XL) 50 MG 24 hr tablet Take 50 mg by mouth once daily.    Historical Provider   risperidone (RISPERDAL) 4 MG tablet Take 4 mg by mouth 2 (two) times daily.    Historical Provider     CURRENT SCHEDULED MEDICATIONS:   atorvastatin  40 mg Oral Daily    chlorhexidine  15 mL Mouth/Throat BID    famotidine (PF)  20 mg Intravenous Daily    levetiracetam IV  500 mg Intravenous Q12H     lisinopriL  20 mg Oral Daily    metoprolol succinate  50 mg Oral Daily    mupirocin   Nasal BID    risperiDONE  4 mg Oral BID     CURRENT INFUSIONS:   niCARdipine 2.5 mg/hr (01/05/22 1030)    sodium chloride 3% 15 mL/hr (01/05/22 1028)     CURRENT PRN MEDICATIONS:  acetaminophen, calcium gluconate IVPB, calcium gluconate IVPB, calcium gluconate IVPB, magnesium sulfate IVPB, magnesium sulfate IVPB, ondansetron, potassium chloride in water **AND** potassium chloride in water **AND** potassium chloride in water, sodium chloride 0.9%    REVIEW OF SYSTEMS:  A review of systems cannot be obtained as the patient is unable to respond to questions appropriately.       PHYSICAL EXAM:  Patient Vitals for the past 24 hrs:   BP Temp Temp src Pulse Resp SpO2 Height Weight   01/05/22 1300 124/71 -- -- 85 18 100 % -- --   01/05/22 1230 127/73 -- -- 83 18 99 % -- --   01/05/22 1200 122/73 -- -- 84 18 97 % -- --   01/05/22 1130 136/73 -- -- 81 18 97 % -- --   01/05/22 1101 117/72 98.9 °F (37.2 °C) -- 91 (!) 22 99 % -- --   01/05/22 1030 131/75 -- -- 83 (!) 27 97 % -- --   01/05/22 1000 125/69 -- -- 83 (!) 25 96 % -- --   01/05/22 0930 121/70 -- -- 96 (!) 27 97 % -- --   01/05/22 0906 -- -- -- 82 17 99 % -- --   01/05/22 0900 123/71 -- -- 83 19 98 % -- --   01/05/22 0800 124/70 -- -- 84 18 99 % -- --   01/05/22 0730 120/70 -- -- 84 18 97 % -- --   01/05/22 0701 131/75 98.4 °F (36.9 °C) -- 86 18 99 % -- --   01/05/22 0600 132/75 -- -- 84 17 98 % -- --   01/05/22 0530 122/78 -- -- 97 (!) 21 98 % -- --   01/05/22 0500 121/79 -- -- 86 (!) 27 99 % -- --   01/05/22 0430 124/74 -- -- 88 19 98 % -- --   01/05/22 0400 121/73 -- -- 86 20 97 % -- --   01/05/22 0330 117/88 -- -- 89 (!) 28 96 % -- --   01/05/22 0300 127/73 98.9 °F (37.2 °C) Oral 91 20 96 % -- --   01/05/22 0230 121/89 -- -- 92 (!) 23 (!) 92 % -- --   01/05/22 0200 137/71 -- -- 94 19 99 % -- --   01/05/22 0130 122/75 -- -- 89 20 (!) 94 % -- --   01/05/22 0100 126/74 -- -- 92  19 100 % -- --   01/05/22 0030 130/77 -- -- 87 17 99 % -- --   01/05/22 0000 135/83 98.7 °F (37.1 °C) -- 92 (!) 27 (!) 92 % -- --   01/04/22 2345 133/77 -- -- 88 18 97 % -- --   01/04/22 2330 133/75 -- -- 90 18 97 % -- --   01/04/22 2315 136/80 -- -- (!) 114 (!) 28 (!) 94 % -- --   01/04/22 2230 -- -- -- -- -- -- -- 41.6 kg (91 lb 11.4 oz)   01/04/22 2210 139/70 98.5 °F (36.9 °C) Oral (!) 123 (!) 28 98 % -- --   01/04/22 2200 (!) 147/86 -- -- 85 -- 98 % -- --   01/04/22 2159 (!) 147/76 -- Oral 89 17 97 % -- --   01/04/22 2130 -- -- -- 89 17 97 % -- --   01/04/22 2045 (!) 147/76 -- -- 102 20 95 % -- --   01/04/22 2030 (!) 141/78 -- -- 106 -- (!) 93 % -- --   01/04/22 2015 (!) 144/76 -- -- 104 20 95 % -- --   01/04/22 2000 (!) 154/75 -- -- 98 19 95 % -- --   01/04/22 1930 (!) 147/73 -- -- 87 20 96 % -- --   01/04/22 1900 (!) 207/91 -- -- 75 20 -- -- --   01/04/22 1830 (!) 181/96 -- -- 72 17 -- -- --   01/04/22 1650 (!) 141/64 98.9 °F (37.2 °C) Oral 75 16 95 % 6' (1.829 m) 45.4 kg (100 lb)       GENERAL APPEARANCE: Alert, frail appearing  male in no acute distress.  HEENT: Normocephalic and atraumatic. PERRL. Oropharynx unremarkable.  PULM: Normal respiratory effort. No accessory muscle use.  CV: RRR.  ABDOMEN: Soft, nontender.  EXTREMITIES: No obvious signs of vascular compromise. Pulses present. No cyanosis, clubbing or edema.  SKIN: Clear; no rashes, lesions or skin breaks in exposed areas.    NEURO:  MENTAL STATUS:  Patient is awake and nonverbal.  He tracks but does not follow commands  He spontaneously moves left upper and lower extremity.  Also moves right upper and lower extremity antigravity to stimulus however has increased tone.  Affect labile.    CRANIAL NERVES:  Pupils equal round and reactive to light, extraocular movements are intact, face is grossly symmetric.     MOTOR:  Bulk normal. Tone Increased right upper and lower extremities.  Abnormal movements absent.  Tremor: none present.  Strength The  antigravity in all extremities.  Strength is greater on the left side compared to the right-sided however cannot assess exact strength as patient does not follow commands..    REFLEXES:  DTRs 2+ throughout.  Plantar response upgoing on right, downgoing on left.  SENSATION: Grossly intact.  COORDINATION: Cannot assess.  STATION: could not be tested.  GAIT: could not be tested.        Labs:  Recent Labs   Lab 01/04/22 1736 01/05/22 0251 01/05/22  1238   * 128* 129*   K 3.3* 3.2* 3.6   CL 93* 90* 92*   CO2 24 23 26   BUN 11 7* 8   CREATININE 0.8 0.6 0.6   * 120* 91   CALCIUM 9.1 9.2 9.2   PHOS  --  3.2  --    MG  --  1.9  --      Recent Labs   Lab 01/04/22 1736 01/05/22 0251   WBC 13.42* 13.58*   HGB 12.9* 13.3*   HCT 38.1* 39.8*    360     Recent Labs   Lab 01/04/22 1736 01/05/22 0251   ALBUMIN 4.6 4.7   PROT 7.9 8.0   BILITOT 0.9 1.2*   ALKPHOS 78 77   ALT 18 20   AST 32 30     Lab Results   Component Value Date    INR 1.0 01/05/2022     Lab Results   Component Value Date    TRIG 34 01/04/2022    HDL 57 01/04/2022    CHOLHDL 37.3 01/04/2022     No results found for: HGBA1C  No results found for: PROTEINCSF, GLUCCSF, WBCCSF    Imaging:  I have reviewed and interpreted the pertinent imaging and lab results.      CT Head Without Contrast  CMS MANDATED QUALITY DATA - CT RADIATION  436    All CT scans at this facility utilize dose modulation, iterative reconstruction, and/or weight based dosing when appropriate to reduce radiation dose to as low as reasonably achievable.    CT HEAD WITHOUT IV CONTRAST    CLINICAL HISTORY:  61 years Male Mental status change, unknown cause; seizure    COMPARISON: None    FINDINGS: Large acute intraparenchymal hemorrhage involving the left parietal and temporal lobes, with surrounding vasogenic edema. Hemorrhage measures 5 cm AP, 3.9 cm transverse, and 3.6 cm craniocaudal. It causes effacement of left lateral ventricular atrium and temporal horn of left lateral  ventricle. 3 mm rightward midline shift. No acute process involving the right cerebral hemisphere. Cerebellar hemispheres and brainstem are unremarkable. Atherosclerotic calcification of intracranial carotid arteries.    No calvarial lesion or fracture. Mastoid air cells are clear. Paranasal sinuses are clear.    IMPRESSION:    Large acute intraparenchymal hemorrhage involving left parietal and temporal lobes.    3 mm rightward midline shift.    Findings called to Dr. Connolly at 5:36 pm.    Electronically signed by:  Serge Pete MD  1/4/2022 5:38 PM [facility] Workstation: [id number]         ASSESSMENT & PLAN:    Left temporoparietal intraparenchymal hemorrhage  Breakthrough seizure  Etiology of hemorrhage is uncertain at this time.       Workup:  CT head:  Left temporoparietal acute intraparenchymal hemorrhage with 3 mm rightward midline shift.  CTA head and neck:  Pending  Repeat CT head:  Pending      Plan:   · Continue care in ICU with q.1 hour neuro checks.  · Hold any antiplatelet or anticoagulation treatment.  · Uncertain as to what antiseizure medications as patient on at home.  He is on Keppra 500 mg b.i.d. here, continue this medication.  · Seizure precautions  · Keep blood pressure less than 140/90  · Continue hypertonic saline at 50cc/hr (3% NS)-goal sodium 140. Check sodium and osmolality every 4 hours.    · MRI Brain when able to help know the etiology of intraparenchymal hemorrhage.   · Fall precautions  · Neurosurgery on board-appreciate recommendations  · Dr. Huddleston discussed with family and they preferred only medical management and no surgical intervention at this time.  · Stat CT head for any acute change in mental status or neuro exam.      Thank you kindly for including us in the care of this patient. Please do not hesitate to contact us with any questions.      Critical Care:  51 minutes of critical care time has been spent evaluating with the patient. Time includes chart review not limited to  diagnostic imaging, labs, and vitals, patient assessment, discussion with family and nursing, current order evaluations, and new order entries.       Riccardo Dubon MD  Neurology/vascular Neurology  Date of Service: 01/05/2022  1:49 PM    --------------------------------------------------------------------------------------------------------------------------------------------------------------------------------------------------------------------------------------------------------------  Please note: This note was transcribed using voice recognition software. Because of this technology there are often uinintended grammatical, spelling, and other transcription errors. Please disregard these errors.

## 2022-01-05 NOTE — CONSULTS
Consult Note  Neurosurgery    Consult Requested By:  Dr. Leggett  Reason for Consult:  Intracranial hemorrhage  SUBJECTIVE:     History of Present Illness:  This is a 61-year-old gentleman with a history of seizure disorder, schizophrenia, and chronic hyponatremia who presents to the emergency department after a witnessed generalized tonic-clonic seizure.  Family at bedside reports that he is severely deconditioned with a poor quality of life.  Evidently he is not taking seizure medications regularly.  On arrival to the emergency department, he was awake, nonverbal, not following commands, grossly moving all extremities.  Initial blood pressure 207/91.  Nicardipine started.  Keppra loaded.  Family notes that at baseline he typically sits outside on his porch and smokes cigarettes throughout the day.    Head CT demonstrates a large acute left parietal temporal intraparenchymal hematoma with 3 mm right-to-left midline shift.  Basilar cisterns patent.  Scheduled Meds:   [START ON 1/5/2022] atorvastatin  40 mg Oral Daily    [START ON 1/5/2022] famotidine (PF)  20 mg Intravenous Daily    [START ON 1/5/2022] lisinopriL  20 mg Oral Daily    [START ON 1/5/2022] metoprolol succinate  50 mg Oral Daily    piperacillin-tazobactam (ZOSYN) IVPB  3.375 g Intravenous Q8H    risperiDONE  4 mg Oral BID     Continuous Infusions:   niCARdipine 0.5 mg/hr (01/04/22 1941)     PRN Meds:acetaminophen, calcium gluconate IVPB, calcium gluconate IVPB, calcium gluconate IVPB, magnesium sulfate IVPB, magnesium sulfate IVPB, ondansetron, potassium chloride in water **AND** potassium chloride in water **AND** potassium chloride in water, sodium chloride 0.9%    Review of patient's allergies indicates:  No Known Allergies    Past Medical History:   Diagnosis Date    Colonic polyp     Dyslipidemia     GERD (gastroesophageal reflux disease)     Hypertension     Hyponatremia     Schizoaffective disorder     Seizures      Past  Surgical History:   Procedure Laterality Date    COLONOSCOPY N/A 1/6/2016    Procedure: COLONOSCOPY;  Surgeon: Coni Navas MD;  Location: [facility];  Service: Endoscopy;  Laterality: N/A;     Family History   Problem Relation Age of Onset    Lung cancer Father      Social History     Tobacco Use    Smoking status: Current Every Day Smoker     Packs/day: 2.00     Types: Cigarettes   Substance Use Topics    Alcohol use: No    Drug use: Not Currently        Review of Systems:      OBJECTIVE:     Vital Signs (Most Recent)  Temp: 98.9 °F (37.2 °C) (01/04/22 1650)  Pulse: 89 (01/04/22 2159)  Resp: 17 (01/04/22 2159)  BP: (!) 147/76 (01/04/22 2159)  SpO2: 97 % (01/04/22 2159)    Vital Signs Range (Last 24H):  Temp:  [98.9 °F (37.2 °C)]   Pulse:  []   Resp:  [16-20]   BP: (141-207)/(64-96)   SpO2:  [93 %-97 %]     Physical Exam:  Neurosurgery Physical Exam  Chronically ill-appearing.  Frail.  Awake, lying supine, nonverbal, pupils 3 mm reactive, gaze conjugate, no nystagmus, no follow commands, grossly moves all extremities  Laboratory:  Labs reviewed    Diagnostic Results:  CT: Reviewed    ASSESSMENT/PLAN:     Acute left parietal intraparenchymal hemorrhage    Seizure disorder with breakthrough seizure    Hypertension    Schizoaffective disorder    Hyponatremia, mild      Recommendations:    I updated the patient's family with the CT findings as above.  We discussed nonoperative versus operative management.  The clearly indicate that they do not want surgical intervention.  They want to proceed with medical management only.      Maintain systolic blood pressure less than 140    Continue Keppra-ordered    Serum sodium goal 135-145    Maintain normal temp, normal glucose, normal volume status    Discussed with Dr. Leggett at bedside    Thank you for your consult.  Please call with questions.

## 2022-01-05 NOTE — CARE UPDATE
01/05/22 0906   PRE-TX-O2   O2 Device (Oxygen Therapy) room air   Pulse Oximetry Type Continuous   $ Pulse Oximetry - Multiple Charge Pulse Oximetry - Multiple   Respiratory Evaluation   $ Care Plan Tech Time 15 min

## 2022-01-05 NOTE — NURSING
"Pt more alert and partially following commands.  When asked if he was in pain he said "yeah".  I asked him where he hurt at and he responded with "yeah".  All verbal responses are "yeah".    "

## 2022-01-05 NOTE — PLAN OF CARE
Rec meds whole or crushed in applesauce, sips of water t/o day for oral comfort. Advancement of oral feeding/trays pending mental status. Will follow in AM 1/6/2021.     Problem: SLP Goal  Goal: SLP Goal  Description: 1. Tolerate cup edge sips of liquid w/out overt sign of aspiration or airway threat  2. Demonstrate prompt a-p transit of puree solids   3. Demonstrate onset of active manipulation and preparation of soft solid with ability to achieve cohesive bolus   4. Follow single step commands at 80% acc  5. Demonstrate orientation x3  Outcome: Ongoing, Progressing

## 2022-01-05 NOTE — PT/OT/SLP PROGRESS
Physical Therapy      Patient Name:  Pa Gallegos   MRN:  [id number]    Patient not seen today secondary to Nursing care,Patient ill (Comment),Other (Comment) (Hold per RN.). Will follow-up 1/6/22.

## 2022-01-05 NOTE — ED PROVIDER NOTES
Encounter Date: 1/4/2022       History     Chief Complaint   Patient presents with    Seizures     Patient had 2 seizures, hx of seizures      HPI     Seen and evaluated.  Presented following reported seizure.  Patient I opened spontaneously, was all 4 extremities, but does not follow commands.  This happened acutely just prior to arrival.  Family reports history of schizophrenia.  He appears frail and chronically ill.  No associated fever or chills. Has had seizures previously.  Family notes concerned about possible chronic hyponatremia.    Review of patient's allergies indicates:  No Known Allergies  Past Medical History:   Diagnosis Date    Colonic polyp     Dyslipidemia     GERD (gastroesophageal reflux disease)     Hypertension     Hyponatremia     Schizoaffective disorder     Seizures      Past Surgical History:   Procedure Laterality Date    COLONOSCOPY N/A 1/6/2016    Procedure: COLONOSCOPY;  Surgeon: Coni Navas MD;  Location: [facility];  Service: Endoscopy;  Laterality: N/A;     Family History   Problem Relation Age of Onset    Lung cancer Father      Social History     Tobacco Use    Smoking status: Current Every Day Smoker     Packs/day: 2.00     Types: Cigarettes   Substance Use Topics    Alcohol use: No    Drug use: Not Currently     Review of Systems   Unable to perform ROS: Mental status change   Neurological: Positive for seizures.   All other systems reviewed and are negative.      Physical Exam     Initial Vitals [01/04/22 1650]   BP Pulse Resp Temp SpO2   (!) 141/64 75 16 98.9 °F (37.2 °C) 95 %      MAP       --         Physical Exam    Nursing note and vitals reviewed.  Constitutional:   Frail thin chronically ill   HENT:   Head: Normocephalic and atraumatic.   Eyes: Conjunctivae are normal. Pupils are equal, round, and reactive to light.   Neck: Neck supple.   Cardiovascular: Normal rate and regular rhythm.   Pulmonary/Chest: No respiratory distress.   Abdominal: Abdomen is  soft. Normal appearance.   Musculoskeletal:         General: Normal range of motion.      Cervical back: Neck supple.     Neurological: He is alert. GCS eye subscore is 4. GCS verbal subscore is 1. GCS motor subscore is 5.   Skin: Skin is warm and dry.   Psychiatric: His speech is normal.         ED Course   Procedures  Labs Reviewed   CBC W/ AUTO DIFFERENTIAL - Abnormal; Notable for the following components:       Result Value    WBC 13.42 (*)     RBC 4.22 (*)     Hemoglobin 12.9 (*)     Hematocrit 38.1 (*)     MPV 8.9 (*)     Gran # (ANC) 12.1 (*)     Immature Grans (Abs) 0.05 (*)     Lymph # 0.6 (*)     Gran % 90.4 (*)     Lymph % 4.2 (*)     All other components within normal limits   COMPREHENSIVE METABOLIC PANEL - Abnormal; Notable for the following components:    Sodium 130 (*)     Potassium 3.3 (*)     Chloride 93 (*)     Glucose 118 (*)     All other components within normal limits   LACTIC ACID, PLASMA - Abnormal; Notable for the following components:    Lactate (Lactic Acid) 3.8 (*)     All other components within normal limits    Narrative:     lactic acid  critical result(s) repeated. Called and verbal readback   obtained from dr marcelo stout er by [facility] 01/04/2022 18:39   ALCOHOL,MEDICAL (ETHANOL)   DRUGS OF ABUSE SCREEN, BLOOD   SARS-COV-2 RNA AMPLIFICATION, QUAL          Imaging Results          CT Head Without Contrast (Final result)  Result time 01/04/22 17:38:16    Final result by Serge Pete MD (01/04/22 17:38:16)                 Narrative:    CMS MANDATED QUALITY DATA - CT RADIATION  436    All CT scans at this facility utilize dose modulation, iterative reconstruction, and/or weight based dosing when appropriate to reduce radiation dose to as low as reasonably achievable.    CT HEAD WITHOUT IV CONTRAST    CLINICAL HISTORY:  61 years Male Mental status change, unknown cause; seizure    COMPARISON: None    FINDINGS: Large acute intraparenchymal hemorrhage involving the left parietal and temporal  lobes, with surrounding vasogenic edema. Hemorrhage measures 5 cm AP, 3.9 cm transverse, and 3.6 cm craniocaudal. It causes effacement of left lateral ventricular atrium and temporal horn of left lateral ventricle. 3 mm rightward midline shift. No acute process involving the right cerebral hemisphere. Cerebellar hemispheres and brainstem are unremarkable. Atherosclerotic calcification of intracranial carotid arteries.    No calvarial lesion or fracture. Mastoid air cells are clear. Paranasal sinuses are clear.    IMPRESSION:    Large acute intraparenchymal hemorrhage involving left parietal and temporal lobes.    3 mm rightward midline shift.    Findings called to Dr. Connolly at 5:36 pm.    Electronically signed by:  Serge Pete MD  1/4/2022 5:38 PM CST Workstation: [id number]                               Medications   niCARdipine 40 mg/200 mL (0.2 mg/mL) infusion (1 mg/hr Intravenous New Bag 1/4/22 1856)   levETIRAcetam in NaCl (iso-os) IVPB 1,000 mg (0 mg Intravenous Stopped 1/4/22 1854)     Medical Decision Making:   Initial Assessment:   Seen and examined.  Presented following seizure.  Family was concerned about possible hyponatremia being related to seizures, but on evaluation, patient was found to have 5 cm intracranial hemorrhage.  Neurosurgery was consulted emergently.  Went to the bedside, had family discussion.  They stated would not be in the patient's wishes to have aggressive management.  In discussion with Neurosurgery and myself in the family, plan made to move for with medical management of this large intracranial hemorrhage.  Cardene has been ordered for blood pressure control with a goal of systolic less than 140. Dr. Huddleston has seen the patient at the bedside.  He will be admitted to the ICU in guarded condition                        Clinical Impression:   Final diagnoses:  [R56.9] Seizure (Primary)  [I62.9] Intracranial hemorrhage          ED Disposition Condition    Admit               Mil  CRISTOBAL Connolly Jr., MD  01/04/22 4503

## 2022-01-05 NOTE — H&P
[facility] Medicine History & Physical Examination   Patient Name: Pa Gallegos  MRN: [id number]  Patient Class: IP- Inpatient   Admission Date: 1/4/2022  4:37 PM  Length of Stay: 0  Attending Physician:   Primary Care Provider: [facility]  Face-to-Face encounter date: 01/04/2022  Code Status: Full Code  MPOA:  Chief Complaint: Seizures (Patient had 2 seizures, hx of seizures )        Patient information was obtained from patient, past medical records and ER records.   HISTORY OF PRESENT ILLNESS:   Pa Gallegos is a 61 y.o. old  male who  has a past medical history of Colonic polyp, Dyslipidemia, GERD (gastroesophageal reflux disease), Hypertension, Hyponatremia, Schizoaffective disorder, and Seizures.. The patient presented to [facility] on 1/4/2022 with a primary complaint of Seizures (Patient had 2 seizures, hx of seizures )  .   61-year-old  male presents to the emergency room for possible seizure.  HPI obtained from ER physician and ED nurse.  This patient has a past medical history significant for schizophrenia, hypertension, chronic hyponatremia, hyperlipidemia and seizures    The patient presented to the ED today with possible seizure activity.  It is unknown whether he had a seizure or if he fell and hit him head his head.  Nonetheless a CT of the head was performed revealing a large intracranial hemorrhage with a right midline shift.    According to the notes neuro surgery Dr. Huddleston was consulted and saw the patient at the bedside.  The ER physician stated that he had a long discussion with the family and they did not want any aggressive treatment for his intracranial hemorrhage.  They wanted only medical management.  The patient is not a DNR however.    On my exam the patient was awake and alert.  He was nonverbal and did not follow commands.  His lower extremities were contracted but he did move his upper extremities without difficulty.  His  pupillary response was equal and symmetrical bilaterally.  I am not certain of his baseline but on his last admit on H&P it was noted that the patient does have a speech impediment    Nonetheless the patient was started on a nicardipine drip and he was given Keppra in the ED.    He will be admitted to the ICU for ICH management    REVIEW OF SYSTEMS:   10 Point Review of System was performed and was found to be negative except for that mentioned already in the HPI and   Review of Systems (Negative unless checked off)  Review of Systems   Unable to perform ROS: Patient nonverbal           PAST MEDICAL HISTORY:     Past Medical History:   Diagnosis Date    Colonic polyp     Dyslipidemia     GERD (gastroesophageal reflux disease)     Hypertension     Hyponatremia     Schizoaffective disorder     Seizures        PAST SURGICAL HISTORY:     Past Surgical History:   Procedure Laterality Date    COLONOSCOPY N/A 1/6/2016    Procedure: COLONOSCOPY;  Surgeon: Coni Navas MD;  Location: [facility];  Service: Endoscopy;  Laterality: N/A;       ALLERGIES:   Patient has no known allergies.    FAMILY HISTORY:     Family History   Problem Relation Age of Onset    Lung cancer Father        SOCIAL HISTORY:     Social History     Tobacco Use    Smoking status: Current Every Day Smoker     Packs/day: 2.00     Types: Cigarettes    Smokeless tobacco: Not on file   Substance Use Topics    Alcohol use: No        Social History     Substance and Sexual Activity   Sexual Activity Not Currently        HOME MEDICATIONS:     Prior to Admission medications    Medication Sig Start Date End Date Taking? Authorizing Provider   amLODIPine (NORVASC) 10 MG tablet Take 10 mg by mouth once daily.    Historical Provider   aspirin (ECOTRIN) 81 MG EC tablet Take 1 tablet (81 mg total) by mouth once daily. 6/16/21 7/16/21  Ramon Mayfield DO   guaifenesin (MUCINEX) 600 mg 12 hr tablet Take 1,200 mg by mouth 2 (two) times daily.     Historical Provider   lisinopril (PRINIVIL,ZESTRIL) 40 MG tablet Take 20 mg by mouth once daily.     Historical Provider   loratadine (CLARITIN) 10 mg tablet Take 10 mg by mouth once daily.    Historical Provider   metoprolol succinate (TOPROL-XL) 50 MG 24 hr tablet Take 50 mg by mouth once daily.    Historical Provider   risperidone (RISPERDAL) 4 MG tablet Take 4 mg by mouth 2 (two) times daily.    Historical Provider         PHYSICAL EXAM:   BP (!) 144/76   Pulse 104   Temp 98.9 °F (37.2 °C) (Oral)   Resp 20   Ht 6' (1.829 m)   Wt 45.4 kg (100 lb)   SpO2 95%   BMI 13.56 kg/m²   Vitals Reviewed  General appearance:  Frail and ill-appearing male in no apparent distress.  Skin: No Rash.   Neuro: Motor and sensory exams grossly intact. Good tone. Power in all 4 extremities 5/5.   HENT: Atraumatic head. Moist mucous membranes of oral cavity.  Eyes: Normal extraocular movements.   Neck: Supple. No evidence of lymphadenopathy. No thyroidomegaly.  Lungs: Clear to auscultation bilaterally. No wheezing present.   Heart: Regular rate and rhythm. S1 and S2 present with positive murmurs/gallop/rub. No pedal edema. No JVD present.   Abdomen: Soft, non-distended, non-tender. No rebound tenderness/guarding. No masses or organomegaly. Bowel sounds are normal. Bladder is not palpable.   Extremities: No cyanosis, clubbing, or edema.  Psych/mental status: Alert opens eyes spontaneously does not follow command nonverbal  EMERGENCY DEPARTMENT LABS AND IMAGING:   Following labs were Reviewed   Recent Labs   Lab 01/04/22  1736   WBC 13.42*   HGB 12.9*   HCT 38.1*      CALCIUM 9.1   ALBUMIN 4.6   PROT 7.9   *   K 3.3*   CO2 24   CL 93*   BUN 11   CREATININE 0.8   ALKPHOS 78   ALT 18   AST 32   BILITOT 0.9         BMP:   Recent Labs   Lab 01/04/22  1736   *   *   K 3.3*   CL 93*   CO2 24   BUN 11   CREATININE 0.8   CALCIUM 9.1   , CMP   Recent Labs   Lab 01/04/22  1736   *   K 3.3*   CL 93*   CO2 24    *   BUN 11   CREATININE 0.8   CALCIUM 9.1   PROT 7.9   ALBUMIN 4.6   BILITOT 0.9   ALKPHOS 78   AST 32   ALT 18   ANIONGAP 13   ESTGFRAFRICA >60.0   EGFRNONAA >60.0   , CBC   Recent Labs   Lab 01/04/22  1736   WBC 13.42*   HGB 12.9*   HCT 38.1*      , INR No results found for: INR, PROTIME, Lipid Panel No results found for: CHOL, HDL, LDLCALC, TRIG, CHOLHDL, Troponin No results for input(s): TROPONINI in the last 168 hours., A1C: No results for input(s): HGBA1C in the last 4320 hours. and All labs within the past 24 hours have been reviewed  Microbiology Results (last 7 days)     ** No results found for the last 168 hours. **        CT Head Without Contrast   Final Result        CT Head Without Contrast    Result Date: 1/4/2022  CMS MANDATED QUALITY DATA - CT RADIATION  436 All CT scans at this facility utilize dose modulation, iterative reconstruction, and/or weight based dosing when appropriate to reduce radiation dose to as low as reasonably achievable. CT HEAD WITHOUT IV CONTRAST CLINICAL HISTORY: 61 years Male Mental status change, unknown cause; seizure COMPARISON: None FINDINGS: Large acute intraparenchymal hemorrhage involving the left parietal and temporal lobes, with surrounding vasogenic edema. Hemorrhage measures 5 cm AP, 3.9 cm transverse, and 3.6 cm craniocaudal. It causes effacement of left lateral ventricular atrium and temporal horn of left lateral ventricle. 3 mm rightward midline shift. No acute process involving the right cerebral hemisphere. Cerebellar hemispheres and brainstem are unremarkable. Atherosclerotic calcification of intracranial carotid arteries. No calvarial lesion or fracture. Mastoid air cells are clear. Paranasal sinuses are clear. IMPRESSION: Large acute intraparenchymal hemorrhage involving left parietal and temporal lobes. 3 mm rightward midline shift. Findings called to Dr. Connolly at 5:36 pm. Electronically signed by:  Serge Pete MD  1/4/2022 5:38 PM CST  Workstation: [id number]    I personally reviewed and agree with the radiologist's findings    Twelve lead EKG pending/bedside tele with normal sinus rhythm  ASSESSMENT & PLAN:   Pa Gallegos is a 61 y.o. male admitted for    1. Intracranial Hemorrhage/: Large acute intraparenchymal hemorrhage involving left parietal and temporal lobes. 3 mm rightward midline shift.  - Neurosurery Dr. Huddleston Consulted   -Neurology consult  - Q1 hour Neuro checks and vital signs   - Keep HOB elevated  - Seizure precaution  - ICH protocol  -PT/OT/ST eval and treat  - Rehab Consult  - According to Dr. Connolly Family did not want any aggressive management    2. H/O Seizure Disorder  - given Keppra in ED  - Seizure precautions  - continue Keppra for now  -EEG ordered neurology will be consulted    3.  Accelerated HTN  - on Cardene gtt    4. H/O Schizophrenia  - continue Home med to manage    5. Chronic Hyponatremia  - gentle IV saline  - monitor     6.  Leukocytosis  -may be reactive from seizure/  -will place on antibiotics prophylactically secondary to elevated lactic acid level  -blood cultures collected and sent in the ED  -repeat lactic acid low    7.  History of tobacco use  -cessation discussion once patient is stable    High risk for clinical decline secondary to large intracranial hemorrhage with midline shift, nonverbal, seizure disorder, need for IV medications    Critical care time spent 1 hour 25 minute    DVT Prophylaxis: will be placed on SCDs for DVT prophylaxis and will be advised to be as mobile as possible and sit in a chair as tolerated.   _____________________________________________________________  Face-to-Face encounter date: 01/04/2022  Encounter included review of the medical records, interviewing and examining the patient face-to-face, discussion with family and other health care providers including emergency medicine physician, admission orders, interpreting lab/test results and formulating a plan of care.    Medical Decision Making during this encounter was  [_] Low Complexity  [_] Moderate Complexity  [x] High Complexity  _________________________________________________________________________________    INPATIENT LIST OF MEDICATIONS     Current Facility-Administered Medications:     acetaminophen tablet 650 mg, 650 mg, Oral, Q6H PRN, Kellen Woo NP    [START ON 1/5/2022] atorvastatin tablet 40 mg, 40 mg, Oral, Daily, Kellen Woo NP    calcium gluconate 1 g in dextrose 5 % 100 mL IVPB, 1 g, Intravenous, PRN, Kellen Woo NP    calcium gluconate 2 g in dextrose 5 % 100 mL IVPB, 2 g, Intravenous, PRN, Kellen Woo NP    calcium gluconate 3 g in dextrose 5 % 100 mL IVPB, 3 g, Intravenous, PRN, Kellen Woo NP    [START ON 1/5/2022] famotidine (PF) injection 20 mg, 20 mg, Intravenous, Daily, Kellen Woo NP    [START ON 1/5/2022] lisinopriL tablet 20 mg, 20 mg, Oral, Daily, Kellen Woo NP    magnesium sulfate 2g in water 50mL IVPB (premix), 2 g, Intravenous, PRN, Kellen Woo NP    magnesium sulfate 2g in water 50mL IVPB (premix), 4 g, Intravenous, PRN, Kellen Woo NP    [START ON 1/5/2022] metoprolol succinate (TOPROL-XL) 24 hr tablet 50 mg, 50 mg, Oral, Daily, Kellen Woo NP    niCARdipine 40 mg/200 mL (0.2 mg/mL) infusion, 0-15 mg/hr, Intravenous, Continuous, Mil Connolly Jr., MD, Last Rate: 2.5 mL/hr at 01/04/22 1941, 0.5 mg/hr at 01/04/22 1941    ondansetron injection 4 mg, 4 mg, Intravenous, Q8H PRN, Kellen Woo, NP    potassium chloride 10 mEq in 100 mL IVPB, 40 mEq, Intravenous, PRN **AND** potassium chloride 10 mEq in 100 mL IVPB, 60 mEq, Intravenous, PRN **AND** potassium chloride 10 mEq in 100 mL IVPB, 80 mEq, Intravenous, PRN, Kellen oWo NP    risperiDONE tablet 4 mg, 4 mg, Oral, BID, Kellen Woo NP    sodium chloride 0.9% flush 10 mL, 10 mL, Intravenous, PRN, Kellen Woo NP    Current Outpatient Medications:     amLODIPine (NORVASC) 10 MG tablet, Take  10 mg by mouth once daily., Disp: , Rfl:     aspirin (ECOTRIN) 81 MG EC tablet, Take 1 tablet (81 mg total) by mouth once daily., Disp: 30 tablet, Rfl: 0    guaifenesin (MUCINEX) 600 mg 12 hr tablet, Take 1,200 mg by mouth 2 (two) times daily., Disp: , Rfl:     lisinopril (PRINIVIL,ZESTRIL) 40 MG tablet, Take 20 mg by mouth once daily. , Disp: , Rfl:     loratadine (CLARITIN) 10 mg tablet, Take 10 mg by mouth once daily., Disp: , Rfl:     metoprolol succinate (TOPROL-XL) 50 MG 24 hr tablet, Take 50 mg by mouth once daily., Disp: , Rfl:     risperidone (RISPERDAL) 4 MG tablet, Take 4 mg by mouth 2 (two) times daily., Disp: , Rfl:       Scheduled Meds:   [START ON 1/5/2022] atorvastatin  40 mg Oral Daily    [START ON 1/5/2022] famotidine (PF)  20 mg Intravenous Daily    [START ON 1/5/2022] lisinopriL  20 mg Oral Daily    [START ON 1/5/2022] metoprolol succinate  50 mg Oral Daily    risperiDONE  4 mg Oral BID     Continuous Infusions:   niCARdipine 0.5 mg/hr (01/04/22 1941)     PRN Meds:.acetaminophen, calcium gluconate IVPB, calcium gluconate IVPB, calcium gluconate IVPB, magnesium sulfate IVPB, magnesium sulfate IVPB, ondansetron, potassium chloride in water **AND** potassium chloride in water **AND** potassium chloride in water, sodium chloride 0.9%      Kellen Woo  [facility] Hospitalist NP  01/04/2022

## 2022-01-05 NOTE — CONSULTS
[facility]  Adult Nutrition   Consult Note (Initial Assessment)     SUMMARY     Recommendations/Interventions:  1. Advance diet as medically able per SLP recommendations.   2. Recommend that a medical diagnosis of severe malnutrition be added to patients problem list.  3. Recommend continued monitoring and management of electrolytes.    Goals:  1. Diet to advance as medically able.   2. Malnutrition added to patients problem list.   3. Electrolytes monitored and managed.  Nutrition Goal Status: new    Dietitian Rounds Brief:  · Consult. Patient admitted 2' intracranial hemorrhage-family only wants medical management  And no surgical intervention. Sodium goal 135-145 per neurology. Failed bedside swallow per RN documentation-recommend SLP assessment. Currently NPO. Patient with a BMI of 12.44- severe malnutrition. No recent weight loss. Will monitor diet advancement and make recommendations accordingly.  Reason for Assessment  Reason For Assessment: consult  Diagnosis: hemorrhage  Relevant Medical History: HTN, smokers, colon polyps  Interdisciplinary Rounds: attended    Nutrition Risk Screen  Nutrition Risk Screen: dysphagia or difficulty swallowing     Nutrition/Diet History  Food Allergies: NKFA  Factors Affecting Nutritional Intake: NPO,difficulty/impaired swallowing    Anthropometrics  Temp: 98.4 °F (36.9 °C)  Height: 6' (182.9 cm)  Height (inches): 72 in  Weight Method: Bed Scale  Weight: 41.6 kg (91 lb 11.4 oz)  Weight (lb): 91.71 lb  Ideal Body Weight (IBW), Male: 178 lb  % Ideal Body Weight, Male (lb): 56.18 %  BMI (Calculated): 12.4  BMI Grade: less than 16 protein-energy malnutrition grade III     Weight History:  Wt Readings from Last 10 Encounters:   01/04/22 41.6 kg (91 lb 11.4 oz)   06/14/21 42.2 kg (93 lb 0.6 oz)   02/19/20 45.8 kg (101 lb)   01/06/16 50.8 kg (112 lb)   06/17/14 49.9 kg (110 lb)   05/20/14 49.9 kg (110 lb)     Lab/Procedures/Meds: Pertinent Labs Reviewed  Clinical  Chemistry:  Recent Labs   Lab 01/05/22 0251   *   K 3.2*   CL 90*   CO2 23   *   BUN 7*   CREATININE 0.6   CALCIUM 9.2   PROT 8.0   ALBUMIN 4.7   BILITOT 1.2*   ALKPHOS 77   AST 30   ALT 20   ANIONGAP 15   ESTGFRAFRICA >60.0   EGFRNONAA >60.0   MG 1.9   PHOS 3.2     CBC:   Recent Labs   Lab 01/05/22 0251   WBC 13.58*   RBC 4.43*   HGB 13.3*   HCT 39.8*      MCV 90   MCH 30.0   MCHC 33.4     Lipid Panel:  Recent Labs   Lab 01/04/22  2200   CHOL 153   HDL 57   LDLCALC 89.2   TRIG 34   CHOLHDL 37.3     Cardiac Profile:  Recent Labs   Lab 01/05/22 0251   CPKMB 8.2*   TROPONINI <0.030     Thyroid & Parathyroid:  Recent Labs   Lab 01/04/22 2200   TSH 1.190  1.190     Medications: Pertinent Medications reviewed  Scheduled Meds:   atorvastatin  40 mg Oral Daily    chlorhexidine  15 mL Mouth/Throat BID    famotidine (PF)  20 mg Intravenous Daily    lisinopriL  20 mg Oral Daily    metoprolol succinate  50 mg Oral Daily    mupirocin   Nasal BID    piperacillin-tazobactam (ZOSYN) IVPB  3.375 g Intravenous Q8H    risperiDONE  4 mg Oral BID     Continuous Infusions:   niCARdipine 0.5 mg/hr (01/04/22 1941)     PRN Meds:.acetaminophen, calcium gluconate IVPB, calcium gluconate IVPB, calcium gluconate IVPB, magnesium sulfate IVPB, magnesium sulfate IVPB, ondansetron, potassium chloride in water **AND** potassium chloride in water **AND** potassium chloride in water, sodium chloride 0.9%    Antibiotics (From admission, onward)            Start     Stop Route Frequency Ordered    01/05/22 0900  mupirocin 2 % ointment  (MRSA Decolonization Orders ST)         01/10 0859 Nasl 2 times daily 01/05/22 0737    01/04/22 2200  piperacillin-tazobactam 3.375 g in dextrose 5 % 50 mL IVPB (ready to mix system)         -- IV Every 8 hours (non-standard times) 01/04/22 2058        Estimated/Assessed Needs  Weight Used For Calorie Calculations: 41.6 kg (91 lb 11.4 oz)  Energy Calorie Requirements (kcal): 4161-2174  kcals/day (35-40 kcals/kg)  Energy Need Method: Kcal/kg  Protein Requirements: 63-83 g/day (1.5-2.0 g/kg)  Weight Used For Protein Calculations: 41.6 kg (91 lb 11.4 oz)  Fluid Requirements (mL): 1 mL/kcal or per MD     RDA Method (mL): 1456     Nutrition Prescription Ordered    Current Diet Order: NPO    Evaluation of Received Nutrient/Fluid Intake    Energy Calories Required: not meeting needs  Protein Required: not meeting needs  Fluid Required: meeting needs  Tolerance:  (NPO)  % Intake of Estimated Energy Needs: 0%  % Meal Intake: NPO    Intake/Output Summary (Last 24 hours) at 1/5/2022 0746  Last data filed at 1/5/2022 0621  Gross per 24 hour   Intake 110 ml   Output 1600 ml   Net -1490 ml      Nutrition Risk    Level of Risk/Frequency of Follow-up: high   Monitor and Evaluation    Food and Nutrient Intake: energy intake,food and beverage intake  Food and Nutrient Adminstration: diet order  Physical Activity and Function: nutrition-related ADLs and IADLs,factors affecting access to physical activity  Anthropometric Measurements: weight,weight change,body mass index  Biochemical Data, Medical Tests and Procedures: electrolyte and renal panel,gastrointestinal profile,glucose/endocrine profile,inflammatory profile,lipid profile  Nutrition-Focused Physical Findings: overall appearance     Nutrition Follow-Up    RD Follow-up?: Yes  Maria E Beckford RD 01/05/2022 10:03 AM

## 2022-01-06 ENCOUNTER — ANESTHESIA EVENT (OUTPATIENT)
Dept: INTENSIVE CARE | Facility: HOSPITAL | Age: 62
DRG: 065 | End: 2022-01-06
Payer: OTHER GOVERNMENT

## 2022-01-06 LAB
ALBUMIN SERPL BCP-MCNC: 3.9 G/DL (ref 3.5–5.2)
ALP SERPL-CCNC: 54 U/L (ref 55–135)
ALT SERPL W/O P-5'-P-CCNC: 19 U/L (ref 10–44)
ANION GAP SERPL CALC-SCNC: 10 MMOL/L (ref 8–16)
ANION GAP SERPL CALC-SCNC: 10 MMOL/L (ref 8–16)
ANION GAP SERPL CALC-SCNC: 11 MMOL/L (ref 8–16)
ANION GAP SERPL CALC-SCNC: 12 MMOL/L (ref 8–16)
AST SERPL-CCNC: 28 U/L (ref 10–40)
BASOPHILS # BLD AUTO: 0.01 K/UL (ref 0–0.2)
BASOPHILS NFR BLD: 0.1 % (ref 0–1.9)
BILIRUB SERPL-MCNC: 1.3 MG/DL (ref 0.1–1)
BUN SERPL-MCNC: 12 MG/DL (ref 8–23)
BUN SERPL-MCNC: 14 MG/DL (ref 8–23)
BUN SERPL-MCNC: 14 MG/DL (ref 8–23)
BUN SERPL-MCNC: 16 MG/DL (ref 8–23)
CALCIUM SERPL-MCNC: 8.7 MG/DL (ref 8.7–10.5)
CALCIUM SERPL-MCNC: 9 MG/DL (ref 8.7–10.5)
CHLORIDE SERPL-SCNC: 101 MMOL/L (ref 95–110)
CHLORIDE SERPL-SCNC: 104 MMOL/L (ref 95–110)
CHLORIDE SERPL-SCNC: 104 MMOL/L (ref 95–110)
CHLORIDE SERPL-SCNC: 108 MMOL/L (ref 95–110)
CO2 SERPL-SCNC: 21 MMOL/L (ref 23–29)
CO2 SERPL-SCNC: 24 MMOL/L (ref 23–29)
CREAT SERPL-MCNC: 0.6 MG/DL (ref 0.5–1.4)
CREAT SERPL-MCNC: 0.7 MG/DL (ref 0.5–1.4)
DIFFERENTIAL METHOD: ABNORMAL
EOSINOPHIL # BLD AUTO: 0 K/UL (ref 0–0.5)
EOSINOPHIL NFR BLD: 0 % (ref 0–8)
ERYTHROCYTE [DISTWIDTH] IN BLOOD BY AUTOMATED COUNT: 12.7 % (ref 11.5–14.5)
EST. GFR  (AFRICAN AMERICAN): >60 ML/MIN/1.73 M^2
EST. GFR  (NON AFRICAN AMERICAN): >60 ML/MIN/1.73 M^2
GLUCOSE SERPL-MCNC: 108 MG/DL (ref 70–110)
GLUCOSE SERPL-MCNC: 93 MG/DL (ref 70–110)
GLUCOSE SERPL-MCNC: 94 MG/DL (ref 70–110)
GLUCOSE SERPL-MCNC: 94 MG/DL (ref 70–110)
HCT VFR BLD AUTO: 32.9 % (ref 40–54)
HGB BLD-MCNC: 10.7 G/DL (ref 14–18)
IMM GRANULOCYTES # BLD AUTO: 0.05 K/UL (ref 0–0.04)
IMM GRANULOCYTES NFR BLD AUTO: 0.6 % (ref 0–0.5)
LYMPHOCYTES # BLD AUTO: 0.9 K/UL (ref 1–4.8)
LYMPHOCYTES NFR BLD: 10.5 % (ref 18–48)
MAGNESIUM SERPL-MCNC: 1.9 MG/DL (ref 1.6–2.6)
MCH RBC QN AUTO: 30 PG (ref 27–31)
MCHC RBC AUTO-ENTMCNC: 32.5 G/DL (ref 32–36)
MCV RBC AUTO: 92 FL (ref 82–98)
MONOCYTES # BLD AUTO: 0.8 K/UL (ref 0.3–1)
MONOCYTES NFR BLD: 10 % (ref 4–15)
NEUTROPHILS # BLD AUTO: 6.5 K/UL (ref 1.8–7.7)
NEUTROPHILS NFR BLD: 78.8 % (ref 38–73)
NRBC BLD-RTO: 0 /100 WBC
PLATELET # BLD AUTO: 312 K/UL (ref 150–450)
PMV BLD AUTO: 9.4 FL (ref 9.2–12.9)
POTASSIUM SERPL-SCNC: 3.2 MMOL/L (ref 3.5–5.1)
POTASSIUM SERPL-SCNC: 3.6 MMOL/L (ref 3.5–5.1)
PROT SERPL-MCNC: 6.8 G/DL (ref 6–8.4)
RBC # BLD AUTO: 3.57 M/UL (ref 4.6–6.2)
SODIUM SERPL-SCNC: 134 MMOL/L (ref 136–145)
SODIUM SERPL-SCNC: 138 MMOL/L (ref 136–145)
SODIUM SERPL-SCNC: 138 MMOL/L (ref 136–145)
SODIUM SERPL-SCNC: 143 MMOL/L (ref 136–145)
WBC # BLD AUTO: 8.3 K/UL (ref 3.9–12.7)

## 2022-01-06 PROCEDURE — 99900031 HC PATIENT EDUCATION (STAT)

## 2022-01-06 PROCEDURE — 95819 EEG AWAKE AND ASLEEP: CPT

## 2022-01-06 PROCEDURE — 63600175 PHARM REV CODE 636 W HCPCS: Performed by: FAMILY MEDICINE

## 2022-01-06 PROCEDURE — 25000003 PHARM REV CODE 250: Performed by: NURSE PRACTITIONER

## 2022-01-06 PROCEDURE — 85025 COMPLETE CBC W/AUTO DIFF WBC: CPT | Performed by: NURSE PRACTITIONER

## 2022-01-06 PROCEDURE — 25000003 PHARM REV CODE 250: Performed by: EMERGENCY MEDICINE

## 2022-01-06 PROCEDURE — 36415 COLL VENOUS BLD VENIPUNCTURE: CPT | Performed by: NURSE PRACTITIONER

## 2022-01-06 PROCEDURE — 20000000 HC ICU ROOM

## 2022-01-06 PROCEDURE — 80048 BASIC METABOLIC PNL TOTAL CA: CPT | Mod: 91 | Performed by: FAMILY MEDICINE

## 2022-01-06 PROCEDURE — 94761 N-INVAS EAR/PLS OXIMETRY MLT: CPT

## 2022-01-06 PROCEDURE — 63600175 PHARM REV CODE 636 W HCPCS: Performed by: NURSE PRACTITIONER

## 2022-01-06 PROCEDURE — 63600175 PHARM REV CODE 636 W HCPCS: Performed by: INTERNAL MEDICINE

## 2022-01-06 PROCEDURE — 99900035 HC TECH TIME PER 15 MIN (STAT)

## 2022-01-06 PROCEDURE — 25000003 PHARM REV CODE 250

## 2022-01-06 PROCEDURE — 80053 COMPREHEN METABOLIC PANEL: CPT | Performed by: NURSE PRACTITIONER

## 2022-01-06 PROCEDURE — 80347 BENZODIAZEPINES 13 OR MORE: CPT | Performed by: EMERGENCY MEDICINE

## 2022-01-06 PROCEDURE — 80048 BASIC METABOLIC PNL TOTAL CA: CPT | Performed by: FAMILY MEDICINE

## 2022-01-06 PROCEDURE — 36415 COLL VENOUS BLD VENIPUNCTURE: CPT | Performed by: FAMILY MEDICINE

## 2022-01-06 PROCEDURE — 83735 ASSAY OF MAGNESIUM: CPT | Performed by: NURSE PRACTITIONER

## 2022-01-06 RX ORDER — LORAZEPAM 2 MG/ML
1 INJECTION INTRAMUSCULAR ONCE
Status: COMPLETED | OUTPATIENT
Start: 2022-01-06 | End: 2022-01-06

## 2022-01-06 RX ADMIN — LISINOPRIL 20 MG: 20 TABLET ORAL at 03:01

## 2022-01-06 RX ADMIN — LORAZEPAM 1 MG: 2 INJECTION INTRAMUSCULAR; INTRAVENOUS at 02:01

## 2022-01-06 RX ADMIN — POTASSIUM CHLORIDE 40 MEQ: 7.46 INJECTION, SOLUTION INTRAVENOUS at 03:01

## 2022-01-06 RX ADMIN — SODIUM CHLORIDE 35 ML/HR: 3 INJECTION, SOLUTION INTRAVENOUS at 12:01

## 2022-01-06 RX ADMIN — MUPIROCIN: 20 OINTMENT TOPICAL at 09:01

## 2022-01-06 RX ADMIN — RISPERIDONE 4 MG: 1 TABLET ORAL at 09:01

## 2022-01-06 RX ADMIN — NICARDIPINE HYDROCHLORIDE 2.5 MG/HR: 0.2 INJECTION INTRAVENOUS at 04:01

## 2022-01-06 RX ADMIN — ATORVASTATIN CALCIUM 40 MG: 40 TABLET, FILM COATED ORAL at 03:01

## 2022-01-06 RX ADMIN — FAMOTIDINE 20 MG: 10 INJECTION, SOLUTION INTRAVENOUS at 09:01

## 2022-01-06 RX ADMIN — LEVETIRACETAM INJECTION 500 MG: 5 INJECTION INTRAVENOUS at 09:01

## 2022-01-06 RX ADMIN — CHLORHEXIDINE GLUCONATE 15 ML: 1.2 RINSE ORAL at 09:01

## 2022-01-06 RX ADMIN — LEVETIRACETAM INJECTION 500 MG: 5 INJECTION INTRAVENOUS at 10:01

## 2022-01-06 RX ADMIN — RISPERIDONE 4 MG: 1 TABLET ORAL at 03:01

## 2022-01-06 NOTE — ANESTHESIA PROCEDURE NOTES
Central Line    Diagnosis: ICH  Doctor requesting consult: hospitalist  Patient location during procedure: ICU  Procedure start time: 1/6/2022 2:45 AM  Timeout: 1/6/2022 2:45 AM  Procedure end time: 1/6/2022 3:00 AM    Staffing  Authorizing Provider: Rojelio Reveles MD  Performing Provider: Rojelio Reveles MD    Staffing  Performed: anesthesiologist   Anesthesiologist: Rojelio Reveles MD  Anesthesiologist was present at the time of the procedure.  Preanesthetic Checklist  Completed: patient identified, site marked, risks and benefits discussed, monitors and equipment checked, timeout performed and anesthesia consent given  Indication   Indication: vascular access, med administration     Anesthesia   local infiltration    Central Line   Skin Prep: skin prepped with ChloraPrep, skin prep agent completely dried prior to procedure  Sterile Barriers Followed: Yes    All five maximal barriers used- gloves, gown, cap, mask, and large sterile sheet    hand hygiene performed prior to central venous catheter insertion  Location: right, right internal jugular.   Catheter type: triple lumen  Catheter Size: 7 Fr  Ultrasound: vascular probe with ultrasound  Vessel Caliber: medium, patent, compressibility normal  Needle advanced into vessel with real time Ultrasound guidance.  Guidewire confirmed in vessel.  sterile gel and probe cover used in ultrasound-guided central venous catheter insertion  Manometry: none  Insertion Attempts: 1   Securement:line sutured, chlorhexidine patch, sterile dressing applied and blood return through all ports    Post-Procedure   X-Ray: no pneumothorax on x-ray, placement verified by x-ray, tip termination and successful placement  Tip termination comments: SVC   Adverse Events:none      Guidewire Guidewire removed intact.   Additional Notes  No indication of arterial puncture at any time.  All ports asp and flushed easily.   Medications:  Medication Administration Time: 1/6/2022 2:45  AM

## 2022-01-06 NOTE — PROGRESS NOTES
[facility]  Department of Neurology  Neurology Progress Note        PATIENT NAME: Pa Gallegos  MRN: [id number]  CSN: [id number]      TODAY'S DATE: 01/06/2022  ADMIT DATE: 1/4/2022                            CONSULTING PROVIDER: Doris Hilton MD  CONSULT REQUESTED BY: Ajit Patel MD      Reason for consult: ICH]    History obtained from chart review.    HPI per EMR: Pa Gallegos is a 61 y.o. male with a history of Colonic polyp, Dyslipidemia, GERD (gastroesophageal reflux disease), Hypertension, Hyponatremia, Schizoaffective disorder, and Seizures.. The patient presented to [facility] on 1/4/2022 with a primary complaint of Seizures.     It is unknown whether he had a seizure or if he fell and hit him head his head.  Nonetheless a CT of the head was performed revealing a large intracranial hemorrhage with a right midline shift.  According to the notes neuro surgery Dr. Huddleston was consulted and saw the patient at the bedside. The ER physician stated that he had a long discussion with the family and they did not want any aggressive treatment for his intracranial hemorrhage.  They wanted only medical management. The patient is not a DNR however. On my exam the patient was awake and alert.  He was nonverbal and did not follow commands.  His lower extremities were contracted but he did move his upper extremities without difficulty.  His pupillary response was equal and symmetrical bilaterally.  I am not certain of his baseline but on his last admit on H&P it was noted that the patient does have a speech impediment. Nonetheless the patient was started on a nicardipine drip and he was given Keppra in the ED.    Neurology consult:  Patient was seen and examined by me in the ICU.  Patient is nonverbal.  On exam, Right upper and lower extremity and increased tone however he is able to move antigravity.  Spontaneous movements of the left upper and lower extremity.  He is awake and tracks  however does not follow to verbal commands.     1/6/22:  Patient was seen and examined by me in the ICU.  He remains aphasic.  He was seen moving his 4 extremities spontaneously, and he is now on bilateral upper extremity restraints.  He was awake but did not follow any commands.  He was trying to vocalize some sounds, but they were incomprehensible.  EEG performed today showed no evidence of seizures.      CURRENT SCHEDULED MEDICATIONS:   atorvastatin  40 mg Oral Daily    chlorhexidine  15 mL Mouth/Throat BID    famotidine (PF)  20 mg Intravenous Daily    levetiracetam IV  500 mg Intravenous Q12H    lisinopriL  20 mg Oral Daily    metoprolol succinate  50 mg Oral Daily    mupirocin   Nasal BID    risperiDONE  4 mg Oral BID     CURRENT INFUSIONS:   niCARdipine 2.5 mg/hr (01/06/22 0457)    sodium chloride 3% 35 mL/hr (01/06/22 0745)     CURRENT PRN MEDICATIONS:  acetaminophen, calcium gluconate IVPB, calcium gluconate IVPB, calcium gluconate IVPB, magnesium sulfate IVPB, magnesium sulfate IVPB, ondansetron, potassium chloride in water **AND** potassium chloride in water **AND** potassium chloride in water, sodium chloride 0.9%       PHYSICAL EXAM:  Patient Vitals for the past 24 hrs:   BP Temp Temp src Pulse Resp SpO2   01/06/22 0830 131/72 -- -- 82 (!) 23 98 %   01/06/22 0815 115/71 -- -- 74 20 (!) 94 %   01/06/22 0800 116/65 -- -- 75 (!) 26 (!) 83 %   01/06/22 0745 126/72 -- -- 68 19 96 %   01/06/22 0730 134/71 -- -- 71 (!) 29 (!) 94 %   01/06/22 0715 126/64 97.8 °F (36.6 °C) Axillary 70 (!) 28 98 %   01/06/22 0700 134/76 -- -- 75 (!) 51 95 %   01/06/22 0600 133/78 -- -- 75 (!) 21 (!) 94 %   01/06/22 0530 131/70 -- -- 72 18 97 %   01/06/22 0500 130/74 -- -- 74 18 97 %   01/06/22 0445 133/76 -- -- 73 19 97 %   01/06/22 0430 (!) 143/76 -- -- 72 17 96 %   01/06/22 0402 (!) 162/74 98.2 °F (36.8 °C) Oral 74 (!) 22 97 %   01/06/22 0330 (!) 148/87 -- -- 75 (!) 21 98 %   01/06/22 0300 135/82 -- -- 71 (!) 22 100 %    01/06/22 0200 127/75 -- -- 70 (!) 29 100 %   01/06/22 0100 (!) 115/59 -- -- 71 15 98 %   01/06/22 0000 110/69 -- -- 74 17 98 %   01/05/22 2300 111/67 -- -- 74 17 96 %   01/05/22 2200 105/62 -- -- 77 18 97 %   01/05/22 2100 124/72 -- -- 75 (!) 22 97 %   01/05/22 2000 120/74 -- -- 74 16 96 %   01/05/22 1947 -- -- -- 76 17 100 %   01/05/22 1900 117/65 98.7 °F (37.1 °C) Oral 73 16 98 %   01/05/22 1800 124/74 -- -- 74 (!) 21 (!) 75 %   01/05/22 1730 126/73 -- -- 75 18 (!) 88 %   01/05/22 1700 126/76 -- -- 77 20 100 %   01/05/22 1630 130/77 -- -- 76 (!) 28 100 %   01/05/22 1600 135/78 -- -- 81 (!) 39 100 %   01/05/22 1530 (!) 141/81 -- -- 74 (!) 39 99 %   01/05/22 1501 137/80 98.8 °F (37.1 °C) -- 79 16 100 %   01/05/22 1500 137/80 -- -- 79 17 100 %   01/05/22 1430 (!) 147/83 -- -- 77 15 100 %   01/05/22 1330 123/69 -- -- 88 (!) 29 98 %   01/05/22 1300 124/71 -- -- 85 18 100 %   01/05/22 1230 127/73 -- -- 83 18 99 %   01/05/22 1200 122/73 -- -- 84 18 97 %   01/05/22 1130 136/73 -- -- 81 18 97 %   01/05/22 1101 117/72 98.9 °F (37.2 °C) -- 91 (!) 22 99 %   01/05/22 1030 131/75 -- -- 83 (!) 27 97 %   01/05/22 1000 125/69 -- -- 83 (!) 25 96 %   01/05/22 0930 121/70 -- -- 96 (!) 27 97 %       GENERAL APPEARANCE: Alert, frail appearing cachectic  male in no acute distress.  HEENT: Normocephalic and atraumatic. PERRL. Oropharynx unremarkable.  PULM: Normal respiratory effort. No accessory muscle use.  CV: RRR.  ABDOMEN: Soft, nontender.  EXTREMITIES: No obvious signs of vascular compromise. Pulses present. No cyanosis, clubbing or edema.  SKIN: Clear; no rashes, lesions or skin breaks in exposed areas.    NEURO:  MENTAL STATUS:  Patient is awake and nonverbal.  He tracks but does not follow commands  He spontaneously moves left upper and lower extremity.  Also moves right upper and lower extremity antigravity to stimulus however has increased tone.    CRANIAL NERVES:  Pupils equal round and reactive to light, extraocular  movements are intact, face is grossly symmetric.     MOTOR:  Bulk normal. Tone Increased right upper and lower extremities.  Abnormal movements absent.  Tremor: none present.  Strength The antigravity in all extremities.  Strength is greater on the left side compared to the right-sided however cannot assess exact strength as patient does not follow commands..    REFLEXES:  DTRs 2+ throughout.  Plantar response upgoing on right, downgoing on left.  SENSATION: Grossly intact.  COORDINATION: Cannot assess.  STATION: could not be tested.  GAIT: could not be tested.        Labs:  Recent Labs   Lab 01/05/22  0251 01/05/22  1238 01/05/22  1848 01/06/22  0009 01/06/22  0348   *   < > 128* 134* 138  138   K 3.2*   < > 4.7 3.6 3.6  3.6   CL 90*   < > 96 101 104  104   CO2 23   < > 21* 21* 24  24   BUN 7*   < > 9 12 14  14   CREATININE 0.6   < > 0.6 0.6 0.6  0.6   *   < > 83 93 94  94   CALCIUM 9.2   < > 8.6* 8.7 8.7  8.7   PHOS 3.2  --   --   --   --    MG 1.9  --   --   --  1.9    < > = values in this interval not displayed.     Recent Labs   Lab 01/04/22  1736 01/05/22  0251 01/06/22  0348   WBC 13.42* 13.58* 8.30   HGB 12.9* 13.3* 10.7*   HCT 38.1* 39.8* 32.9*    360 312     Recent Labs   Lab 01/04/22  1736 01/05/22  0251 01/06/22  0348   ALBUMIN 4.6 4.7 3.9   PROT 7.9 8.0 6.8   BILITOT 0.9 1.2* 1.3*   ALKPHOS 78 77 54*   ALT 18 20 19   AST 32 30 28     Lab Results   Component Value Date    INR 1.0 01/05/2022     Lab Results   Component Value Date    TRIG 34 01/04/2022    HDL 57 01/04/2022    CHOLHDL 37.3 01/04/2022     Lab Results   Component Value Date    HGBA1C 5.2 01/04/2022     No results found for: PROTEINCSF, GLUCCSF, WBCCSF    Imaging:  I have reviewed and interpreted the pertinent imaging and lab results.      X-Ray Chest AP Portable  Chest single view    CLINICAL DATA: Central line placement    FINDINGS: Comparison to June 2021.    Heart size is normal. The mediastinum is unremarkable.  Right IJ central catheter has been introduced with tip at the level of the superior vena cava.    No pneumothorax is identified. The lungs are hyperinflated compatible with COPD. No infiltrates or effusions are identified.    IMPRESSION:  1. Right IJ central catheter appears appropriately positioned with no pneumothorax.  2. COPD.    Electronically signed by:  Manpreet Brooks MD  1/6/2022 6:19 AM CST Workstation: [id number]     CT brain 1/4/22:  CLINICAL HISTORY:  61 years Male Mental status change, unknown cause; seizure     COMPARISON: None     FINDINGS: Large acute intraparenchymal hemorrhage involving the left parietal and temporal lobes, with surrounding vasogenic edema. Hemorrhage measures 5 cm AP, 3.9 cm transverse, and 3.6 cm craniocaudal. It causes effacement of left lateral ventricular atrium and temporal horn of left lateral ventricle. 3 mm rightward midline shift. No acute process involving the right cerebral hemisphere. Cerebellar hemispheres and brainstem are unremarkable. Atherosclerotic calcification of intracranial carotid arteries.     No calvarial lesion or fracture. Mastoid air cells are clear. Paranasal sinuses are clear.     IMPRESSION:     Large acute intraparenchymal hemorrhage involving left parietal and temporal lobes.     3 mm rightward midline shift.     CT brain 1/5/22:  CLINICAL HISTORY:  61 years (1960) Male intraparenchymal hemorrhage Intracranial hemorrhage     TECHNIQUE:  CT HEAD WITHOUT IV CONTRAST. 114 images obtained. Axial CT of the brain without contrast using soft tissue and bone algorithm. .     COMPARISON:  Most recent study from  1.4.22     FINDINGS:  Acute intraparenchymal bleed in the left temporal lobe measuring approximately 77 x 31 x 24 mm (AP X TR X CC) (sagittal image 25) previously measuring 77 x 29 x 25 mm (AP X TR X CC), when measured in a similar fashion (1/4/2022). There is adjacent low attenuation/edema in the left temporal lobe. There is mild  effacement of the adjacent temporal horn of the left lateral ventricle.     There is approximately 2 mm of midline shift, left or right. No hydrocephalus or herniation is identified. Scattered calcified plaques are seen in the intracranial internal carotid arteries bilaterally.     Orbital contents appear within normal limits. External auditory canals are unremarkable. The visualized paranasal sinuses and mastoid air cells are essentially clear.     IMPRESSION:  1. Acute intraparenchymal hemorrhage in the left parietotemporal region, essentially unchanged when accounting for differences in imaging technique.  2. Slight midline shift, left-to-right of 2 mm, improved from the previous exam.     CTA head and neck 1/5/22:  Narrative & Impression  The study was performed with thin sections with subsequent 3-D reformations  and reconstructions at multiple planes with images permanently stored in the PACS system.     All CT scans at this facility use dose modulation, degenerative reconstruction  and/or weight-based dosing when appropriate to reduce radiation dose to as low as reasonably achievable.     CTA of the head and neck     HISTORY: Intracranial hemorrhage.     Examination utilizes 100 mL of intravenous nonionic contrast material.     There are atheromatous changes within the arch of the thoracic aorta. Mild atheromatous changes involve the aortic branch vessels without high-grade stenosis.     There are mild atheromatous changes and mild tortuosity of the common carotid arteries bilaterally. There are no findings of hemodynamically significant narrowing. Similarly, mild atheromatous changes involve the carotid bulbs and proximal segments of the internal and external carotid arteries without evidence of hemodynamically significant stenosis.     There is a dominant right vertebral artery. The cervical segments of the vertebral arteries are patent.     There are mild atheromatous changes involving the intracranial  segments of the internal carotid arteries bilaterally. The anterior and middle cerebral arteries and major branches appear patent. The intracranial segments of the vertebral arteries, basilar artery and major branch vessels including the posterior cerebral arteries enhance appropriately. There are patent posterior communicating arteries bilaterally. There is no evidence of aneurysm or vascular malformation.     Observation is made incidentally of emphysematous changes within the visualized lung apices. Secretions are observed within the trachea.     IMPRESSION:     Scattered atheromatous changes as described.     No evidence of hemodynamically significant stenosis involving the extracranial segments of the carotid arteries.     No findings of hemodynamically significant narrowing of the major intracranial arteries or branches.    Electronically signed by:  Eleazar Lima MD  1/5/2022 2:37 PM CST Workstation: [id number]        ASSESSMENT & PLAN:    Left temporoparietal intraparenchymal hemorrhage  Breakthrough seizure  Etiology of hemorrhage is uncertain at this time. However, upon review of brain MRI from June 2021, there was evidence of small microhemorrhages predominantly subcortical and in basal ganglia suggestive of hypertensive etiology.  Differential also includes cerebral amyloid angiopathy, but this is less likely.    Workup:  CT head:  Left temporoparietal acute intraparenchymal hemorrhage with 3 mm rightward midline shift.  CTA head and neck:  No LVO, aneurysm or malformation identified  Repeat CT head:  redemonstrates large left parietotemporal IPH, improved edema now with 2 mm rightward midline shift  EEG: focal slowing on left temporooccipital leads, no seizures    Plan:   · Continue care in ICU with q.1 hour neuro checks.  · Hold any antiplatelet treatment. Consider starting low dose SQ heparin for DVT prophylaxis tomorrow if no worsening of mental status, as repeat CT showed no evidence of hematoma  expansion  · Uncertain as to what antiseizure medications as patient on at home.  He is on Keppra 500 mg b.i.d. here, continue this medication.  · Seizure precautions  · Keep blood pressure less than 160/90  · Continue hypertonic saline at 50cc/hr (3% NS)-goal sodium 140. Check sodium and osmolality every 4 hours.    · MRI Brain when able to help figure out the etiology of intraparenchymal hemorrhage.   · Fall precautions  · Neurosurgery on board-appreciate recommendations  · Dr. Huddleston discussed with family and they preferred only medical management and no surgical intervention at this time.  · Stat CT head for any acute change in mental status or neuro exam.      Thank you kindly for including us in the care of this patient. Please do not hesitate to contact us with any questions.      Critical Care:  55 minutes of critical care time has been spent evaluating with the patient. Time includes chart review not limited to diagnostic imaging, labs, and vitals, patient assessment, discussion with family and nursing, current order evaluations, and new order entries.       Doris Hilton MD  Neurology/Vascular Neurology  Date of Service: 01/06/2022

## 2022-01-06 NOTE — NURSING
Pt eyes shut.  Very thin in appearance.     Turned, and examined for breakdown.-- Moderate brown BM found-- Cleaned and linens changed.    On air mattress, No breakdown found. -- Pt is high risk-- very thin, poor muscle mass.    Pupils pinpoint.  Doesn't look at speaker.  Groans when stimulated or interacted with.   SOUZA, slowly, semipurposefully.  Reaches for blankets slowly and grimaces as to indicate he is cold...added blankets and positioned for comfort.     Pt fair to weak cough, poor to zero gag when used yanker to assist with loose Phlem at back of throat.

## 2022-01-06 NOTE — PT/OT/SLP PROGRESS
Speech Language Pathology      Brentzane Gallegos  MRN: [id number]    Patient not seen today secondary to: cognitive status unchanged, on hold. Family electing hospice. Will d/c current order.

## 2022-01-06 NOTE — CARE UPDATE
01/05/22 1947   Patient Assessment/Suction   Respiratory Effort Unlabored   Rhythm/Pattern, Respiratory pattern regular   PRE-TX-O2   O2 Device (Oxygen Therapy) room air   SpO2 100 %   Pulse Oximetry Type Continuous   Pulse 76   Resp 17   Respiratory Evaluation   $ Care Plan Tech Time 15 min   $ Eval/Re-eval Charges Evaluation   Evaluation For   (care plan)

## 2022-01-06 NOTE — PT/OT/SLP PROGRESS
Physical Therapy      Patient Name:  Pa Gallegos   MRN:  [id number]    Patient not seen today secondary to Other (Comment) (Pt on RN hold again today. In pm, noted pt's discharge plan is now hospice. Will discontinue PT order.). Will not follow-up.

## 2022-01-06 NOTE — CARE UPDATE
Cm attempted initial discharge assessment with family listed on face sheet, was unable to make contact, cm left voice message on father Osiris's phone for return call [phone]

## 2022-01-06 NOTE — PLAN OF CARE
Problem: Feeding Intolerance (Enteral Nutrition)  Goal: Feeding Tolerance  Outcome: Ongoing, Progressing  Intervention: Prevent and Manage Feeding Intolerance  Flowsheets (Taken 1/6/2022 2750)  Nutrition Support Management: tube feeding initiated

## 2022-01-06 NOTE — PROCEDURES
EEG REPORT    NAME: Pa Gallegos  : 1960  MRN: [id number]    DATE of EE2022    CLINICAL INDICATION: Pa Gallegos is a 61 y.o. male with a history of Colonic polyp, Dyslipidemia, GERD (gastroesophageal reflux disease), Hypertension, Hyponatremia, Schizoaffective disorder, and Seizures who presented to [facility] on 2022 with breakthrough seizures. CT brain was performed and showed a large intraparenchymal hemorrhage, so he was admitted to the ICU. No reported seizure activity, but patient remains aphasic.      MEDICATIONS:  Scheduled Meds:   atorvastatin  40 mg Oral Daily    chlorhexidine  15 mL Mouth/Throat BID    famotidine (PF)  20 mg Intravenous Daily    levetiracetam IV  500 mg Intravenous Q12H    lisinopriL  20 mg Oral Daily    metoprolol succinate  50 mg Oral Daily    mupirocin   Nasal BID    risperiDONE  4 mg Oral BID     Continuous Infusions:   niCARdipine 1 mg/hr (22 1252)    sodium chloride 3% 35 mL/hr (22 1230)     PRN Meds:.acetaminophen, calcium gluconate IVPB, calcium gluconate IVPB, calcium gluconate IVPB, magnesium sulfate IVPB, magnesium sulfate IVPB, ondansetron, potassium chloride in water **AND** potassium chloride in water **AND** potassium chloride in water, sodium chloride 0.9%    EEG DESCRIPTION:  A 16-channel EEG was performed with electrode placement in 10/20 International System. Longitudinal bipolar and referential montages were utilized in analysis.    The background consists of a mixture of theta and delta rhythms, with no posterior dominant rhythm seen. Stage II sleep structures are not seen.    There is continuous focal slowing in the delta range seen on the left temporooccipital leads.    Photic stimulation is performed with no abnormal reactivity noted. Hyperventilation is not performed.     No epileptiform discharges were recorded. No electrographic or electroclinical seizures were recorded.    Impression:    This is an abnormal  EEG due to diffuse slowing of the background activity consistent with a moderate to severe encephalopathy. There is continuous focal slowing in the left temporooccipital leads consistent with focal cortical dysfunction. No seizures were recorded.      Doris Hilton MD  Neurologist

## 2022-01-06 NOTE — PT/OT/SLP PROGRESS
Occupational Therapy      Patient Name:  Pa Gallegos   MRN:  [id number]    Patient not seen today secondary to Other (Comment) (Pt not appropriate for therapy session this date.). Will follow-up next service date.    Addendum: Noted in chart that family has decided on hospice. Will d/c current OT orders.    1/6/2022

## 2022-01-06 NOTE — CONSULTS
[facility]  Adult Nutrition   Consult Note (Nutrition Support Management)    SUMMARY     Recommendations/Interventions:  1. When ready to start tube feedings, Start Jevity 1.5 @ 10 mL/hr and advance by 10 mL Q4hrs as tolerated to goal rate of 50 mL/hr with 30 mL FWF's Q4hrs.   · TF + FWF's to provide: 1800 kcals,77 g protein, and 1092 FW/day.   2. Monitor for signs and symptoms of refeedings syndrome (Hypophosphatemia, hypomagnesemia, hypokalemia, hyperglycemia). Ordered appropriate labs.   3. RD to monitor enteral nutrition tolerance, labs, plan of care, and make recommendations accordingly.  Goals: 1. Patient to tolerate enteral nutrition. 2. Blood glucose and electrolytes monitored and managed.  Nutrition Goal Status: new    Dietitian Rounds Brief:  · Consult for tube feedings. Patient continues to remain nonverbal. SLP cannot evaluate patient-continue NPO. MD ordered NGT and for tube feedings to be initiated. Given severe malnutrition-patient is at high risk for refeeding syndrome. Father inquiring about hospice. Tube feedings for now. Will monitor.   Reason for Assessment  Reason For Assessment: consult  Diagnosis: hemorrhage  Relevant Medical History: HTN, smokers, colon polyps  Interdisciplinary Rounds: attended    Nutrition Risk Screen  Nutrition Risk Screen: dysphagia or difficulty swallowing     MST Score: 2  Have you recently lost weight without trying?: Yes: Unsure how much  Weight loss score: 2  Have you been eating poorly because of a decreased appetite?: No  Appetite score: 0     Nutrition/Diet History  Food Allergies: NKFA  Factors Affecting Nutritional Intake: NPO,difficulty/impaired swallowing    Anthropometrics  Temp: 97.8 °F (36.6 °C)  Height: 6' (182.9 cm)  Height (inches): 72 in  Weight Method: Bed Scale  Weight: 41.6 kg (91 lb 11.4 oz)  Weight (lb): 91.71 lb  Ideal Body Weight (IBW), Male: 178 lb  % Ideal Body Weight, Male (lb): 56.18 %  BMI (Calculated): 12.4  BMI Grade: less  than 16 protein-energy malnutrition grade III     Weight History:  Wt Readings from Last 10 Encounters:   01/04/22 41.6 kg (91 lb 11.4 oz)   06/14/21 42.2 kg (93 lb 0.6 oz)   02/19/20 45.8 kg (101 lb)   01/06/16 50.8 kg (112 lb)   06/17/14 49.9 kg (110 lb)   05/20/14 49.9 kg (110 lb)     Lab/Procedures/Meds: Pertinent Labs Reviewed  Clinical Chemistry:  Recent Labs   Lab 01/05/22  0251 01/06/22  0348   * 138  138   K 3.2* 3.6  3.6   CL 90* 104  104   CO2 23 24  24   * 94  94   BUN 7* 14  14   CREATININE 0.6 0.6  0.6   CALCIUM 9.2 8.7  8.7   PROT 8.0 6.8   ALBUMIN 4.7 3.9   BILITOT 1.2* 1.3*   ALKPHOS 77 54*   AST 30 28   ALT 20 19   ANIONGAP 15 10  10   ESTGFRAFRICA >60.0 >60.0  >60.0   EGFRNONAA >60.0 >60.0  >60.0   MG 1.9 1.9   PHOS 3.2  --     < > = values in this interval not displayed.     CBC:   Recent Labs   Lab 01/06/22 0348   WBC 8.30   RBC 3.57*   HGB 10.7*   HCT 32.9*      MCV 92   MCH 30.0   MCHC 32.5     Lipid Panel:  Recent Labs   Lab 01/04/22  2200   CHOL 153   HDL 57   LDLCALC 89.2   TRIG 34   CHOLHDL 37.3     Cardiac Profile:  Recent Labs   Lab 01/05/22  0251   CPKMB 8.2*   TROPONINI <0.030     Diabetes:  Recent Labs   Lab 01/04/22  2200   HGBA1C 5.2     Thyroid & Parathyroid:  Recent Labs   Lab 01/04/22  2200   TSH 1.190  1.190     Medications: Pertinent Medications reviewed  Scheduled Meds:   atorvastatin  40 mg Oral Daily    chlorhexidine  15 mL Mouth/Throat BID    famotidine (PF)  20 mg Intravenous Daily    levetiracetam IV  500 mg Intravenous Q12H    lisinopriL  20 mg Oral Daily    metoprolol succinate  50 mg Oral Daily    mupirocin   Nasal BID    risperiDONE  4 mg Oral BID     Continuous Infusions:   niCARdipine 1 mg/hr (01/06/22 1252)    sodium chloride 3% 35 mL/hr (01/06/22 1230)     PRN Meds:.acetaminophen, calcium gluconate IVPB, calcium gluconate IVPB, calcium gluconate IVPB, magnesium sulfate IVPB, magnesium sulfate IVPB, ondansetron, potassium  chloride in water **AND** potassium chloride in water **AND** potassium chloride in water, sodium chloride 0.9%    Antibiotics (From admission, onward)            Start     Stop Route Frequency Ordered    01/05/22 0900  mupirocin 2 % ointment  (MRSA Decolonization Orders [facility])         01/10 0859 Nasl 2 times daily 01/05/22 0737        Estimated/Assessed Needs  Weight Used For Calorie Calculations: 41.6 kg (91 lb 11.4 oz)  Energy Calorie Requirements (kcal): 3762-8760 kcals/day (40-45 kcals/kg)  Energy Need Method: Kcal/kg  Protein Requirements: 63-83 g/day (1.5-2.0 g/kg)  Weight Used For Protein Calculations: 41.6 kg (91 lb 11.4 oz)  Fluid Requirements (mL): 1 mL/kcal or per MD     RDA Method (mL): 1664    Nutrition Prescription Ordered    Current Diet Order: NPO    Evaluation of Received Nutrient/Fluid Intake    Energy Calories Required: not meeting needs  Protein Required: not meeting needs  Fluid Required: meeting needs  Tolerance:  (NPO)  % Intake of Estimated Energy Needs: 0%  % Meal Intake: NPO    Intake/Output Summary (Last 24 hours) at 1/6/2022 1354  Last data filed at 1/6/2022 0600  Gross per 24 hour   Intake 1084.92 ml   Output 2550 ml   Net -1465.08 ml      Nutrition Risk    Level of Risk/Frequency of Follow-up: high   Monitor and Evaluation    Food and Nutrient Intake: energy intake,enteral nutrition intake  Food and Nutrient Adminstration: enteral and parenteral nutrition administration  Physical Activity and Function: nutrition-related ADLs and IADLs,factors affecting access to physical activity  Anthropometric Measurements: weight,body mass index,weight change  Biochemical Data, Medical Tests and Procedures: electrolyte and renal panel,gastrointestinal profile,glucose/endocrine profile,inflammatory profile,lipid profile  Nutrition-Focused Physical Findings: overall appearance     Nutrition Follow-Up    RD Follow-up?: Yes  Maria E Beckford RD 01/06/2022 1:56 PM

## 2022-01-06 NOTE — PLAN OF CARE
01/06/22 1422   Post-Acute Status   Post-Acute Authorization Hospice   Hospice Status Referrals Sent   Discharge Delays None known at this time   Discharge Plan   Discharge Plan A Hospice/home   Discharge Plan B Inpatient Hospice;Comfort care/withdrawal       Spoke to father Osiris([phone]) about the order for hospice.  He requested [facility], referral sent, shawnree notified, packet completed [facility].   to continue to follow for completion.

## 2022-01-06 NOTE — CARE UPDATE
01/06/22 1005   PRE-TX-O2   O2 Device (Oxygen Therapy) room air   Pulse Oximetry Type Continuous   $ Pulse Oximetry - Multiple Charge Pulse Oximetry - Multiple   Respiratory Evaluation   $ Care Plan Tech Time 15 min

## 2022-01-06 NOTE — PROGRESS NOTES
[facility] Medicine  Progress Note    Patient name: Pa Gallegos  MRN: [id number]  Admit Date: 1/4/2022   LOS: 2 days     SUBJECTIVE:     Principal problem: Intracranial hemorrhage    Interval History:  Patient continues to be nonverbal. Not able to write or respond to basic yes/no questions.  Speech therapy unable to evaluate patient.  NG tube ordered.  Hyponatremia improved on 3% saline.  Continues spent Cardene drip.  Case discussed with patient's father, Osiris, over the phone at length.  At baseline, patient is able to feed himself, microwave food, ambulate outside to smoke.  Father would like to have more information regarding hospice.     Hospital course  Patient admitted for large acute intraparenchymal hemorrhage with midline shift.  Patient was placed in ICU with close monitoring.  Family did not want to pursue aggressive surgical intervention.  Neurosurgery recommended medical management.  Patient was started on hypertonic saline with worsening sodium and IV anti hypertensives.  Family decided on DNR.    Scheduled Meds:   atorvastatin  40 mg Oral Daily    chlorhexidine  15 mL Mouth/Throat BID    famotidine (PF)  20 mg Intravenous Daily    levetiracetam IV  500 mg Intravenous Q12H    lisinopriL  20 mg Oral Daily    metoprolol succinate  50 mg Oral Daily    mupirocin   Nasal BID    risperiDONE  4 mg Oral BID     Continuous Infusions:   niCARdipine 1 mg/hr (01/06/22 1252)    sodium chloride 3% 35 mL/hr (01/06/22 1230)     PRN Meds:acetaminophen, calcium gluconate IVPB, calcium gluconate IVPB, calcium gluconate IVPB, magnesium sulfate IVPB, magnesium sulfate IVPB, ondansetron, potassium chloride in water **AND** potassium chloride in water **AND** potassium chloride in water, sodium chloride 0.9%    Review of patient's allergies indicates:  No Known Allergies    Review of Systems  Unable to obtain due to acute on chronic mental debility    OBJECTIVE:     Vital Signs (Most  Recent)  Temp: 97.8 °F (36.6 °C) (01/06/22 1115)  Pulse: 77 (01/06/22 1300)  Resp: (!) 21 (01/06/22 1300)  BP: 121/71 (01/06/22 1245)  SpO2: 97 % (01/06/22 1300)    Vital Signs Range (Last 24H):  Temp:  [97.8 °F (36.6 °C)-98.8 °F (37.1 °C)]   Pulse:  [67-89]   Resp:  [15-51]   BP: (105-162)/(59-87)   SpO2:  [75 %-100 %]     I & O (Last 24H):    Intake/Output Summary (Last 24 hours) at 1/6/2022 1345  Last data filed at 1/6/2022 0600  Gross per 24 hour   Intake 1084.92 ml   Output 2550 ml   Net -1465.08 ml       Physical Exam:  General: Patient resting comfortably in no acute distress. Appears as stated age. Calm. [place].  Cachectic, chronic ill-appearing.   Eyes: EOM intact. No conjunctivae injection. No scleral icterus.  ENT: Hearing grossly intact. No discharge from ears. No nasal discharge.   CVS: RRR. No LE edema BL.  Lungs: CTA BL, no wheezing or crackles. Good breath sounds. No accessory muscle use. No acute respiratory distress  Neuro: Alert. Cranial nerves grossly intact. Moves all extremities equally.  Does not follow commands or respond to questions appropriately.  Nonverbal.    Laboratory:  All pertinent labs within the past 24 hours have been reviewed.  CBC:   Recent Labs   Lab 01/04/22 1736 01/05/22  0251 01/06/22  0348   WBC 13.42* 13.58* 8.30   HGB 12.9* 13.3* 10.7*   HCT 38.1* 39.8* 32.9*    360 312     CMP:   Recent Labs   Lab 01/04/22  1736 01/04/22  1736 01/05/22  0251 01/05/22  1238 01/05/22  1848 01/06/22  0009 01/06/22  0348   *   < > 128*   < > 128* 134* 138  138   K 3.3*   < > 3.2*   < > 4.7 3.6 3.6  3.6   CL 93*   < > 90*   < > 96 101 104  104   CO2 24   < > 23   < > 21* 21* 24  24   *   < > 120*   < > 83 93 94  94   BUN 11   < > 7*   < > 9 12 14  14   CREATININE 0.8   < > 0.6   < > 0.6 0.6 0.6  0.6   CALCIUM 9.1   < > 9.2   < > 8.6* 8.7 8.7  8.7   PROT 7.9  --  8.0  --   --   --  6.8   ALBUMIN 4.6  --  4.7  --   --   --  3.9   BILITOT 0.9  --  1.2*  --   --   --   1.3*   ALKPHOS 78  --  77  --   --   --  54*   AST 32  --  30  --   --   --  28   ALT 18  --  20  --   --   --  19   ANIONGAP 13   < > 15   < > 11 12 10  10   EGFRNONAA >60.0   < > >60.0   < > >60.0 >60.0 >60.0  >60.0    < > = values in this interval not displayed.       Microbiology Results (last 7 days)     ** No results found for the last 168 hours. **           Diagnostic Results:  X-Ray Chest AP Portable [[id number]] Collected: 01/06/22 0302   Order Status: Completed Updated: 01/06/22 0620   Narrative:     Chest single view     CLINICAL DATA: Central line placement     FINDINGS: Comparison to June 2021.     Heart size is normal. The mediastinum is unremarkable. Right IJ central catheter has been introduced with tip at the level of the superior vena cava.     No pneumothorax is identified. The lungs are hyperinflated compatible with COPD. No infiltrates or effusions are identified.     IMPRESSION:   1. Right IJ central catheter appears appropriately positioned with no pneumothorax.   2. COPD.        CTA Head and Neck (xpd) [[id number]] Collected: 01/05/22 1404   Order Status: Completed Updated: 01/05/22 2439   Narrative:     The study was performed with thin sections with subsequent 3-D reformations  and reconstructions at multiple planes with images permanently stored in the PACS system.     All CT scans at this facility use dose modulation, degenerative reconstruction  and/or weight-based dosing when appropriate to reduce radiation dose to as low as reasonably achievable.     CTA of the head and neck     HISTORY: Intracranial hemorrhage.     Examination utilizes 100 mL of intravenous nonionic contrast material.     There are atheromatous changes within the arch of the thoracic aorta. Mild atheromatous changes involve the aortic branch vessels without high-grade stenosis.     There are mild atheromatous changes and mild tortuosity of the common carotid arteries bilaterally. There are no findings of  hemodynamically significant narrowing. Similarly, mild atheromatous changes involve the carotid bulbs and proximal segments of the internal and external carotid arteries without evidence of hemodynamically significant stenosis.     There is a dominant right vertebral artery. The cervical segments of the vertebral arteries are patent.     There are mild atheromatous changes involving the intracranial segments of the internal carotid arteries bilaterally. The anterior and middle cerebral arteries and major branches appear patent. The intracranial segments of the vertebral arteries, basilar artery and major branch vessels including the posterior cerebral arteries enhance appropriately. There are patent posterior communicating arteries bilaterally. There is no evidence of aneurysm or vascular malformation.     Observation is made incidentally of emphysematous changes within the visualized lung apices. Secretions are observed within the trachea.     IMPRESSION:     Scattered atheromatous changes as described.     No evidence of hemodynamically significant stenosis involving the extracranial segments of the carotid arteries.     No findings of hemodynamically significant narrowing of the major intracranial arteries or branches.        CT Head Without Contrast [[id number]] Collected: 01/05/22 1404   Order Status: Completed Updated: 01/05/22 1433   Narrative:     CMS MANDATED QUALITY DATA - CT RADIATION 436     All CT scans at this facility utilize dose modulation, iterative reconstruction, and/or weight based dosing when appropriate to reduce radiation dose to as low as reasonably achievable.     CLINICAL HISTORY:   61 years (1960) Male intraparenchymal hemorrhage Intracranial hemorrhage     TECHNIQUE:   CT HEAD WITHOUT IV CONTRAST. 114 images obtained. Axial CT of the brain without contrast using soft tissue and bone algorithm. .     COMPARISON:   Most recent study from  1.4.22     FINDINGS:   Acute intraparenchymal  bleed in the left temporal lobe measuring approximately 77 x 31 x 24 mm (AP X TR X CC) (sagittal image 25) previously measuring 77 x 29 x 25 mm (AP X TR X CC), when measured in a similar fashion (1/4/2022). There is adjacent low attenuation/edema in the left temporal lobe. There is mild effacement of the adjacent temporal horn of the left lateral ventricle.     There is approximately 2 mm of midline shift, left or right. No hydrocephalus or herniation is identified. Scattered calcified plaques are seen in the intracranial internal carotid arteries bilaterally.     Orbital contents appear within normal limits. External auditory canals are unremarkable. The visualized paranasal sinuses and mastoid air cells are essentially clear.     IMPRESSION:   1. Acute intraparenchymal hemorrhage in the left parietotemporal region, essentially unchanged when accounting for differences in imaging technique.   2. Slight midline shift, left-to-right of 2 mm, improved from the previous exam.          ASSESSMENT/PLAN:     Active Hospital Problems    Diagnosis  POA    *Intracranial hemorrhage [I62.9]  Yes    Seizure [R56.9]  Yes    Hyponatremia [E87.1]  Yes    Essential hypertension [I10]  Yes      Resolved Hospital Problems   No resolved problems to display.           Plan:   ICU  Family decided on medical management only  Neuro check  Elevate head of bed  Hypertonic saline.  Sodium goal 135-145  BMP q.4 hours  Empiric Keppra IV  Seizure precautions  Cardizem drip to maintain BP less than 140  PT/OT/ST  Continue medications   for hospice information    Neurosurgery consult  Neurology consult    DNR      VTE Risk Mitigation (From admission, onward)         Ordered     IP VTE LOW RISK PATIENT  Once         01/04/22 2017     Place sequential compression device  Until discontinued         01/04/22 2017                        Patient care time was spent personally by me on the following activities: > 35 min  · Obtaining a  history.  · Examination of patient.  · Providing medical care at the patients bedside.  · Developing a treatment plan with patient or surrogate and bedside caregivers.  · Ordering and reviewing laboratory studies, radiographic studies, pulse oximetry.  · Ordering and performing treatments and interventions.  · Evaluation of patient's response to treatment.  · Discussions with consultants while on the unit and immediately available to the patient.  · Re-evaluation of the patient's condition.  · Documentation in the medical record.       Department Hospital Medicine  [facility]  Ajit Patel MD    Please note: This note was transcribed using voice recognition software. Because of this technology, there are often uinintended grammatical, spelling, and other transcription errors. Please disregard these errors.

## 2022-01-07 VITALS
HEIGHT: 72 IN | DIASTOLIC BLOOD PRESSURE: 92 MMHG | SYSTOLIC BLOOD PRESSURE: 174 MMHG | HEART RATE: 81 BPM | WEIGHT: 91.69 LBS | RESPIRATION RATE: 30 BRPM | TEMPERATURE: 98 F | OXYGEN SATURATION: 98 % | BODY MASS INDEX: 12.42 KG/M2

## 2022-01-07 PROBLEM — Z66 DNR (DO NOT RESUSCITATE): Chronic | Status: ACTIVE | Noted: 2022-01-07

## 2022-01-07 PROBLEM — Z51.5 HOSPICE CARE: Chronic | Status: ACTIVE | Noted: 2022-01-07

## 2022-01-07 LAB — GLUCOSE SERPL-MCNC: 128 MG/DL (ref 70–110)

## 2022-01-07 PROCEDURE — 99900035 HC TECH TIME PER 15 MIN (STAT)

## 2022-01-07 PROCEDURE — 99900031 HC PATIENT EDUCATION (STAT)

## 2022-01-07 PROCEDURE — 63600175 PHARM REV CODE 636 W HCPCS: Performed by: FAMILY MEDICINE

## 2022-01-07 PROCEDURE — 94761 N-INVAS EAR/PLS OXIMETRY MLT: CPT

## 2022-01-07 PROCEDURE — 25000003 PHARM REV CODE 250: Performed by: NURSE PRACTITIONER

## 2022-01-07 RX ADMIN — LEVETIRACETAM INJECTION 500 MG: 5 INJECTION INTRAVENOUS at 09:01

## 2022-01-07 RX ADMIN — FAMOTIDINE 20 MG: 10 INJECTION, SOLUTION INTRAVENOUS at 09:01

## 2022-01-07 NOTE — NURSING
D/c'd right TLC and x2 heplocks all with catheters intact. Emptied varghese, will allow to remain intact for hospice.

## 2022-01-07 NOTE — HOSPITAL COURSE
Patient admitted for large acute intraparenchymal hemorrhage with midline shift.  Patient was placed in ICU with close monitoring.  Family did not want to pursue aggressive surgical intervention.  Neurosurgery recommended medical management.  Patient was started on hypertonic saline with worsening sodium and IV anti hypertensives.  Family decided on DNR.  Patient mentation remained poor.  Family decided to pursue home hospice.  Patient was discharged home with hospice.    General: Patient resting comfortably in no acute distress. Appears as stated age. Calm. Cachectic, chronic ill-appearing.   Eyes: EOM intact. No conjunctivae injection. No scleral icterus.  ENT: Hearing grossly intact. No discharge from ears. No nasal discharge.   CVS: RRR. No LE edema BL.  Lungs: CTA BL, no wheezing or crackles. Good breath sounds. No accessory muscle use. No acute respiratory distress  Neuro:  Does not follow commands or respond to questions appropriately.  Nonverbal.

## 2022-01-07 NOTE — NURSING
On assessment pt tries to opens eyes, does not follow any commands at this time. Moves all extremities with some strength.pinpoint pupils. Seizure precautions maintained, bed alarm intact. Groans when assessed. Covered with blanket.

## 2022-01-07 NOTE — DISCHARGE SUMMARY
[facility] Medicine  Discharge Summary      Patient Name: Pa Gallegos  MRN: [id number]  Patient Class: IP- Inpatient  Admission Date: 1/4/2022  Hospital Length of Stay: 3 days  Discharge Date and Time:  01/07/2022 3:21 PM  Attending Physician: Ajit Patel MD   Discharging Provider: Ajit Patel MD  Primary Care Provider: [facility]      HPI:   Pa Gallegos is a 61 y.o. old  male who  has a past medical history of Colonic polyp, Dyslipidemia, GERD (gastroesophageal reflux disease), Hypertension, Hyponatremia, Schizoaffective disorder, and Seizures.. The patient presented to [facility] on 1/4/2022 with a primary complaint of Seizures (Patient had 2 seizures, hx of seizures )  .   61-year-old  male presents to the emergency room for possible seizure.  HPI obtained from ER physician and ED nurse.  This patient has a past medical history significant for schizophrenia, hypertension, chronic hyponatremia, hyperlipidemia and seizures     The patient presented to the ED today with possible seizure activity.  It is unknown whether he had a seizure or if he fell and hit him head his head.  Nonetheless a CT of the head was performed revealing a large intracranial hemorrhage with a right midline shift.     According to the notes neuro surgery Dr. Huddleston was consulted and saw the patient at the bedside.  The ER physician stated that he had a long discussion with the family and they did not want any aggressive treatment for his intracranial hemorrhage.  They wanted only medical management.  The patient is not a DNR however.     On my exam the patient was awake and alert.  He was nonverbal and did not follow commands.  His lower extremities were contracted but he did move his upper extremities without difficulty.  His pupillary response was equal and symmetrical bilaterally.  I am not certain of his baseline but on his last admit on H&P it was noted that the  patient does have a speech impediment     Nonetheless the patient was started on a nicardipine drip and he was given Keppra in the ED.     He will be admitted to the ICU for ICH management      * No surgery found *      Hospital Course:   Patient admitted for large acute intraparenchymal hemorrhage with midline shift.  Patient was placed in ICU with close monitoring.  Family did not want to pursue aggressive surgical intervention.  Neurosurgery recommended medical management.  Patient was started on hypertonic saline with worsening sodium and IV anti hypertensives.  Family decided on DNR.  Patient mentation remained poor.  Family decided to pursue home hospice.  Patient was discharged home with hospice.    General: Patient resting comfortably in no acute distress. Appears as stated age. Calm. Cachectic, chronic ill-appearing.   Eyes: EOM intact. No conjunctivae injection. No scleral icterus.  ENT: Hearing grossly intact. No discharge from ears. No nasal discharge.   CVS: RRR. No LE edema BL.  Lungs: CTA BL, no wheezing or crackles. Good breath sounds. No accessory muscle use. No acute respiratory distress  Neuro:  Does not follow commands or respond to questions appropriately.  Nonverbal.       Goals of Care Treatment Preferences:  Code Status: DNR      Consults:   Consults (From admission, onward)        Status Ordering Provider     Inpatient consult to Registered Dietitian/Nutritionist  Once        Provider:  (Not yet assigned)    Completed DONNY PERES     Inpatient consult to   Once        Provider:  (Not yet assigned)    Acknowledged DONNY PERES     Inpatient consult to Vascular (Stroke) Neurology  Once        Provider:  (Not yet assigned)    Acknowledged MACI VELEZ     Inpatient consult to Neurology  Once        Provider:  Riccardo Dubon MD    Completed MACI VELEZ     Inpatient consult to Registered Dietitian/Nutritionist  Once        Provider:  (Not yet assigned)     Completed KELLEN VELEZ     IP consult to case management/social work  Once        Provider:  (Not yet assigned)    Acknowledged KELLEN VELEZ     Inpatient consult to Neurosurgery  Once        Provider:  Ok Huddleston DO    Completed AURA MALIK JR     Inpatient consult to Internal Medicine  Once        Provider:  Kellen Velez, NP    Acknowledged AURA MALIK JR          No new Assessment & Plan notes have been filed under this hospital service since the last note was generated.  Service: Hospital Medicine    Final Active Diagnoses:    Diagnosis Date Noted POA    PRINCIPAL PROBLEM:  Intracranial hemorrhage [I62.9] 01/04/2022 Yes    DNR (do not resuscitate) [Z66] 01/07/2022 Yes     Chronic    Hospice care [Z51.5] 01/07/2022 Not Applicable     Chronic    Seizure [R56.9] 01/04/2022 Yes    Hyponatremia [E87.1] 06/14/2021 Yes    Essential hypertension [I10] 02/19/2020 Yes      Problems Resolved During this Admission:       Discharged Condition: poor    Disposition: Hospice/Home    Follow Up:   Contact information for follow-up providers     [facility].    Specialty: Internal Medicine  Why: As needed  Contact information:  [address]  [facility] [address]  [phone]             Riccardo Dubon MD.    Specialty: Neurology  Why: Neurology follow-up, As needed  Contact information:  [address]  [facility] [id number] [phone]             Ok Huddleston DO.    Specialty: Neurosurgery  Why: As needed, Neurosurgery  Contact information:  [address] [place] [address] [address]  [address]  [facility] [address]  [phone]             [facility]    Specialty: Emergency Medicine  Why: As needed  Contact information:  [address]  [facility] [id number] [phone]  Additional information:  1st floor                 Contact information for after-discharge care     Home Medical Care     [facility].    Service: Home Health Services  Contact  information:  [address]  [facility] [id number] [phone]                           Patient Instructions:      Diet Adult Regular     Notify your health care provider if you experience any of the following:  temperature >100.4     Notify your health care provider if you experience any of the following:  persistent nausea and vomiting or diarrhea     Notify your health care provider if you experience any of the following:  severe uncontrolled pain     Notify your health care provider if you experience any of the following:  redness, tenderness, or signs of infection (pain, swelling, redness, odor or green/yellow discharge around incision site)     Notify your health care provider if you experience any of the following:  difficulty breathing or increased cough     Notify your health care provider if you experience any of the following:  severe persistent headache     Notify your health care provider if you experience any of the following:  worsening rash     Notify your health care provider if you experience any of the following:  persistent dizziness, light-headedness, or visual disturbances     Notify your health care provider if you experience any of the following:  increased confusion or weakness     Activity as tolerated       Significant Diagnostic Studies: Labs:   CMP   Recent Labs   Lab 01/06/22  0009 01/06/22  0348 01/06/22  1534   * 138  138 143   K 3.6 3.6  3.6 3.2*    104  104 108   CO2 21* 24  24 24   GLU 93 94  94 108   BUN 12 14  14 16   CREATININE 0.6 0.6  0.6 0.7   CALCIUM 8.7 8.7  8.7 9.0   PROT  --  6.8  --    ALBUMIN  --  3.9  --    BILITOT  --  1.3*  --    ALKPHOS  --  54*  --    AST  --  28  --    ALT  --  19  --    ANIONGAP 12 10  10 11   ESTGFRAFRICA >60.0 >60.0  >60.0 >60.0   EGFRNONAA >60.0 >60.0  >60.0 >60.0    and CBC   Recent Labs   Lab 01/06/22  0348   WBC 8.30   HGB 10.7*   HCT 32.9*          Pending Diagnostic Studies:     Procedure Component  Value Units Date/Time    Drugs of Abuse Screen, Blood [[id number]] Collected: 01/04/22 1736    Order Status: Sent Lab Status: In process Updated: 01/04/22 1745    Specimen: Blood          Medications:  Reconciled Home Medications:      Medication List      CONTINUE taking these medications    amLODIPine 10 MG tablet  Commonly known as: NORVASC  Take 10 mg by mouth once daily.     aspirin 81 MG EC tablet  Commonly known as: ECOTRIN  Take 1 tablet (81 mg total) by mouth once daily.     guaiFENesin 600 mg 12 hr tablet  Commonly known as: MUCINEX  Take 1,200 mg by mouth 2 (two) times daily.     lisinopriL 40 MG tablet  Commonly known as: PRINIVIL,ZESTRIL  Take 20 mg by mouth once daily.     loratadine 10 mg tablet  Commonly known as: CLARITIN  Take 10 mg by mouth once daily.     metoprolol succinate 50 MG 24 hr tablet  Commonly known as: TOPROL-XL  Take 50 mg by mouth once daily.     risperiDONE 4 MG tablet  Commonly known as: RISPERDAL  Take 4 mg by mouth 2 (two) times daily.            Indwelling Lines/Drains at time of discharge:   Lines/Drains/Airways     Central Venous Catheter Line            Percutaneous Central Line Insertion/Assessment - Triple Lumen  01/06/22 0245 right internal jugular 1 day          Drain                 Urethral Catheter 01/05/22 0535 Double-lumen 16 Fr. 2 days                Time spent on the discharge of patient: 35 minutes         Ajit Patel MD  Department of Hospital Medicine  [facility]

## 2022-01-07 NOTE — NURSING
Ambulance here to take pt home with hospice. All belongings with pt. Called pt's father and made aware ambulance was here to would arrive at his home shortly. Left message.

## 2022-01-07 NOTE — CARE UPDATE
01/06/22 2032   PRE-TX-O2   O2 Device (Oxygen Therapy) room air   Pulse Oximetry Type Continuous   $ Pulse Oximetry - Multiple Charge Pulse Oximetry - Multiple   Education   $ Education 15 min   Respiratory Evaluation   $ Care Plan Tech Time 15 min

## 2022-01-07 NOTE — PLAN OF CARE
Hospice to meet with family in home at 11:00 am.  [facility] requested progress notes and h&P, sent via right fax.  Cm to follow for completion       01/07/22 7024   Post-Acute Status   Post-Acute Authorization Hospice   Hospice Status Pending post acute provider review/more information requested   Discharge Delays (!) Post-Acute Set-up   Discharge Plan   Discharge Plan A Hospice/home   Discharge Plan B Hospice/home

## 2022-01-07 NOTE — PLAN OF CARE
01/07/22 0905   Patient Assessment/Suction   Respiratory Effort Normal;Unlabored   PRE-TX-O2   O2 Device (Oxygen Therapy) room air   SpO2 98 %   Pulse Oximetry Type Continuous   $ Pulse Oximetry - Multiple Charge Pulse Oximetry - Multiple   Pulse 82   Resp (!) 22   Education   $ Education 15 min   Respiratory Evaluation   $ Care Plan Tech Time 15 min

## 2022-01-07 NOTE — HPI
Pa Gallegos is a 61 y.o. old  male who  has a past medical history of Colonic polyp, Dyslipidemia, GERD (gastroesophageal reflux disease), Hypertension, Hyponatremia, Schizoaffective disorder, and Seizures.. The patient presented to [facility] on 1/4/2022 with a primary complaint of Seizures (Patient had 2 seizures, hx of seizures )  .   61-year-old  male presents to the emergency room for possible seizure.  HPI obtained from ER physician and ED nurse.  This patient has a past medical history significant for schizophrenia, hypertension, chronic hyponatremia, hyperlipidemia and seizures     The patient presented to the ED today with possible seizure activity.  It is unknown whether he had a seizure or if he fell and hit him head his head.  Nonetheless a CT of the head was performed revealing a large intracranial hemorrhage with a right midline shift.     According to the notes neuro surgery Dr. Huddleston was consulted and saw the patient at the bedside.  The ER physician stated that he had a long discussion with the family and they did not want any aggressive treatment for his intracranial hemorrhage.  They wanted only medical management.  The patient is not a DNR however.     On my exam the patient was awake and alert.  He was nonverbal and did not follow commands.  His lower extremities were contracted but he did move his upper extremities without difficulty.  His pupillary response was equal and symmetrical bilaterally.  I am not certain of his baseline but on his last admit on H&P it was noted that the patient does have a speech impediment     Nonetheless the patient was started on a nicardipine drip and he was given Keppra in the ED.     He will be admitted to the ICU for ICH management

## 2022-01-19 LAB
AMPHETAMINES SERPL QL SCN: NEGATIVE NG/ML
BARBITURATES SERPL QL SCN: NEGATIVE UG/ML
BENZODIAZ SERPL QL SCN: ABNORMAL NG/ML
BENZODIAZ SERPL QL SCN: NEGATIVE NG/ML
BENZODIAZ SPEC QL: NORMAL
CANNABINOIDS SERPL QL SCN: NEGATIVE NG/ML
METHADONE SERPL QL SCN: NEGATIVE NG/ML
OPIATES SERPL QL SCN: NEGATIVE NG/ML
OXYCODONE+OXYMORPHONE SERPLBLD QL SCN: NEGATIVE NG/ML
PCP SERPL QL SCN: NEGATIVE NG/ML
PROPOXYPH SERPL QL SCN: NEGATIVE NG/ML